# Patient Record
Sex: FEMALE | Race: WHITE | NOT HISPANIC OR LATINO | Employment: FULL TIME | URBAN - METROPOLITAN AREA
[De-identification: names, ages, dates, MRNs, and addresses within clinical notes are randomized per-mention and may not be internally consistent; named-entity substitution may affect disease eponyms.]

---

## 2017-07-10 DIAGNOSIS — F41.9 ANXIETY: Primary | ICD-10-CM

## 2017-07-10 RX ORDER — CITALOPRAM 20 MG/1
20 TABLET, FILM COATED ORAL DAILY
Qty: 90 TABLET | Refills: 0 | Status: SHIPPED | OUTPATIENT
Start: 2017-07-10 | End: 2017-10-10 | Stop reason: SDUPTHER

## 2017-09-13 ENCOUNTER — TELEPHONE (OUTPATIENT)
Dept: FAMILY MEDICINE | Facility: CLINIC | Age: 37
End: 2017-09-13

## 2017-09-13 DIAGNOSIS — Z00.00 PREVENTATIVE HEALTH CARE: Primary | ICD-10-CM

## 2017-09-13 NOTE — TELEPHONE ENCOUNTER
----- Message from Yashira Nice sent at 9/13/2017  3:05 PM CDT -----  Contact: PATIENT  [facility] for annual physical 10/3, needs lab orders sent to [facility] by [facility]

## 2017-10-03 ENCOUNTER — OFFICE VISIT (OUTPATIENT)
Dept: FAMILY MEDICINE | Facility: CLINIC | Age: 37
End: 2017-10-03
Payer: COMMERCIAL

## 2017-10-03 VITALS — WEIGHT: 128 LBS | BODY MASS INDEX: 21.85 KG/M2 | HEIGHT: 64 IN

## 2017-10-03 DIAGNOSIS — Z00.00 ANNUAL PHYSICAL EXAM: Primary | ICD-10-CM

## 2017-10-03 DIAGNOSIS — R31.21 ASYMPTOMATIC MICROSCOPIC HEMATURIA: ICD-10-CM

## 2017-10-03 DIAGNOSIS — N39.41 URGE INCONTINENCE OF URINE: ICD-10-CM

## 2017-10-03 LAB
ALBUMIN SERPL-MCNC: 4.6 G/DL (ref 3.5–5.5)
ALBUMIN/GLOB SERPL: 1.6 {RATIO} (ref 1.2–2.2)
ALP SERPL-CCNC: 52 IU/L (ref 39–117)
ALT SERPL-CCNC: 10 IU/L (ref 0–32)
APPEARANCE UR: ABNORMAL
AST SERPL-CCNC: 15 IU/L (ref 0–40)
BACTERIA #/AREA URNS HPF: ABNORMAL /[HPF]
BASOPHILS # BLD AUTO: 0 X10E3/UL (ref 0–0.2)
BASOPHILS NFR BLD AUTO: 1 %
BILIRUB SERPL-MCNC: 0.5 MG/DL (ref 0–1.2)
BILIRUB UR QL STRIP: NEGATIVE
BUN SERPL-MCNC: 17 MG/DL (ref 6–20)
BUN/CREAT SERPL: 27 (ref 9–23)
CALCIUM SERPL-MCNC: 8.9 MG/DL (ref 8.7–10.2)
CHLORIDE SERPL-SCNC: 101 MMOL/L (ref 96–106)
CHOLEST SERPL-MCNC: 196 MG/DL (ref 100–199)
CO2 SERPL-SCNC: 23 MMOL/L (ref 18–29)
COLOR UR: YELLOW
CREAT SERPL-MCNC: 0.63 MG/DL (ref 0.57–1)
EOSINOPHIL # BLD AUTO: 0.3 X10E3/UL (ref 0–0.4)
EOSINOPHIL NFR BLD AUTO: 4 %
EPI CELLS #/AREA URNS HPF: >10 /HPF
ERYTHROCYTE [DISTWIDTH] IN BLOOD BY AUTOMATED COUNT: 12.8 % (ref 12.3–15.4)
GLOBULIN SER CALC-MCNC: 2.9 G/DL (ref 1.5–4.5)
GLUCOSE SERPL-MCNC: 92 MG/DL (ref 65–99)
GLUCOSE UR QL: NEGATIVE
HCT VFR BLD AUTO: 41.7 % (ref 34–46.6)
HDLC SERPL-MCNC: 67 MG/DL
HGB BLD-MCNC: 13.6 G/DL (ref 11.1–15.9)
HGB UR QL STRIP: ABNORMAL
IMM GRANULOCYTES # BLD: 0 X10E3/UL (ref 0–0.1)
IMM GRANULOCYTES NFR BLD: 0 %
KETONES UR QL STRIP: NEGATIVE
LDLC SERPL CALC-MCNC: 118 MG/DL (ref 0–99)
LEUKOCYTE ESTERASE UR QL STRIP: ABNORMAL
LYMPHOCYTES # BLD AUTO: 2.2 X10E3/UL (ref 0.7–3.1)
LYMPHOCYTES NFR BLD AUTO: 31 %
MCH RBC QN AUTO: 29.8 PG (ref 26.6–33)
MCHC RBC AUTO-ENTMCNC: 32.6 G/DL (ref 31.5–35.7)
MCV RBC AUTO: 91 FL (ref 79–97)
MICRO URNS: ABNORMAL
MONOCYTES # BLD AUTO: 0.6 X10E3/UL (ref 0.1–0.9)
MONOCYTES NFR BLD AUTO: 8 %
MUCOUS THREADS URNS QL MICRO: PRESENT
NEUTROPHILS # BLD AUTO: 4 X10E3/UL (ref 1.4–7)
NEUTROPHILS NFR BLD AUTO: 56 %
NITRITE UR QL STRIP: NEGATIVE
PH UR STRIP: 7 [PH] (ref 5–7.5)
PLATELET # BLD AUTO: 245 X10E3/UL (ref 150–379)
POTASSIUM SERPL-SCNC: 4.5 MMOL/L (ref 3.5–5.2)
PROT SERPL-MCNC: 7.5 G/DL (ref 6–8.5)
PROT UR QL STRIP: NEGATIVE
RBC # BLD AUTO: 4.57 X10E6/UL (ref 3.77–5.28)
RBC #/AREA URNS HPF: ABNORMAL /HPF
SODIUM SERPL-SCNC: 139 MMOL/L (ref 134–144)
SP GR UR: 1.03 (ref 1–1.03)
TRIGL SERPL-MCNC: 53 MG/DL (ref 0–149)
TSH SERPL DL<=0.005 MIU/L-ACNC: 1.72 UIU/ML (ref 0.45–4.5)
UROBILINOGEN UR STRIP-MCNC: 0.2 MG/DL (ref 0.2–1)
VLDLC SERPL CALC-MCNC: 11 MG/DL (ref 5–40)
WBC # BLD AUTO: 7.1 X10E3/UL (ref 3.4–10.8)
WBC #/AREA URNS HPF: ABNORMAL /HPF

## 2017-10-03 PROCEDURE — 99395 PREV VISIT EST AGE 18-39: CPT | Mod: ,,, | Performed by: NURSE PRACTITIONER

## 2017-10-03 RX ORDER — TOLTERODINE 2 MG/1
2 CAPSULE, EXTENDED RELEASE ORAL NIGHTLY
Qty: 30 CAPSULE | Refills: 3 | Status: SHIPPED | OUTPATIENT
Start: 2017-10-03 | End: 2018-02-01 | Stop reason: SDUPTHER

## 2017-10-03 RX ORDER — TRIAMCINOLONE ACETONIDE 55 UG/1
2 SPRAY, METERED NASAL DAILY
COMMUNITY
End: 2018-03-13

## 2017-10-03 NOTE — PROGRESS NOTES
Subjective:       Patient ID: Portia Cruz is a 36 y.o. female.    Chief Complaint: Annual Exam    Portia is here today for annual physical and medication refill.  She does have c/o urinary incontinence that is worsening since her last visit.  She reports that her mood is good on Celexa.      Urinary Frequency    This is a new problem. The current episode started more than 1 year ago. The problem occurs intermittently. The problem has been gradually worsening. The patient is experiencing no pain. There has been no fever. She is sexually active. There is no history of pyelonephritis. Associated symptoms include frequency, hematuria and urgency. Pertinent negatives include no behavior changes, chills, discharge, flank pain, hesitancy, nausea, possible pregnancy, sweats, vomiting, weight loss, bubble bath use, constipation, rash or withholding. Her past medical history is significant for recurrent UTIs. There is no history of catheterization, diabetes mellitus, genitourinary reflux, hypertension, kidney stones, STD, urinary stasis or a urological procedure.   Anxiety   Presents for follow-up visit. Symptoms include irritability, muscle tension and nervous/anxious behavior. Patient reports no chest pain, compulsions, confusion, decreased concentration, depressed mood, dizziness, dry mouth, excessive worry, feeling of choking, hyperventilation, insomnia, malaise, nausea, obsessions, palpitations, panic, restlessness, shortness of breath or suicidal ideas. Symptoms occur occasionally. The severity of symptoms is mild. The quality of sleep is good. Nighttime awakenings: occasional.           Review of Systems   Constitutional: Positive for irritability. Negative for activity change, appetite change, chills, fatigue and weight loss.   HENT: Positive for rhinorrhea and sinus pressure. Negative for congestion and sore throat.    Eyes: Negative for pain and visual disturbance.   Respiratory: Negative for cough and  shortness of breath.    Cardiovascular: Negative for chest pain and palpitations.   Gastrointestinal: Negative for abdominal pain, constipation, diarrhea, nausea and vomiting.   Endocrine: Negative for polydipsia, polyphagia and polyuria.   Genitourinary: Positive for frequency, hematuria and urgency. Negative for dysuria, flank pain and hesitancy.   Musculoskeletal: Negative for arthralgias, gait problem and myalgias.   Skin: Negative for color change, pallor and rash.   Allergic/Immunologic: Negative for immunocompromised state.   Neurological: Negative for dizziness, syncope, numbness and headaches.   Hematological: Negative for adenopathy.   Psychiatric/Behavioral: Negative for agitation, confusion, decreased concentration, dysphoric mood, self-injury, sleep disturbance and suicidal ideas. The patient is nervous/anxious. The patient does not have insomnia and is not hyperactive.         Past Surgical History:   Procedure Laterality Date    PATELLA REALIGNMENT  6/2013    Dr. Dueñas       History reviewed. No pertinent family history.     Social History     Social History    Marital status:      Spouse name: N/A    Number of children: 2    Years of education: N/A     Occupational History     [employer]     Social History Main Topics    Smoking status: Never Smoker    Smokeless tobacco: Never Used    Alcohol use 0.0 oz/week      Comment: once a month    Drug use: No    Sexual activity: Yes     Partners: Male     Birth control/ protection: Pill      Comment: No hx of STDs     Other Topics Concern    None     Social History Narrative    The patient does not exercise regularly ().    Rates diet as good.    She is satisfied with weight.                   Current Outpatient Prescriptions   Medication Sig Dispense Refill    CETIRIZINE HCL (ZYRTEC ORAL) Take by mouth.      citalopram (CELEXA) 20 MG tablet Take 1 tablet (20 mg total) by mouth once daily. 90 tablet 0    triamcinolone (NASACORT) 55 mcg  "nasal inhaler 2 sprays by Nasal route once daily.      tolterodine (DETROL LA) 2 MG Cp24 Take 1 capsule (2 mg total) by mouth nightly. 30 capsule 3     No current facility-administered medications for this visit.        Review of patient's allergies indicates:   Allergen Reactions    Ativan [lorazepam]       Objective:   Height 5' 4" (1.626 m), weight 58.1 kg (128 lb), last menstrual period 10/03/2017. Body mass index is 21.97 kg/m².       Physical Exam   Constitutional: She is oriented to person, place, and time. She appears well-developed and well-nourished. No distress.   HENT:   Head: Normocephalic and atraumatic.   Right Ear: External ear normal.   Left Ear: External ear normal.   Nose: Nose normal.   Mouth/Throat: Oropharynx is clear and moist. No oropharyngeal exudate.   Eyes: Conjunctivae, EOM and lids are normal. Pupils are equal, round, and reactive to light. Right eye exhibits no discharge. Left eye exhibits no discharge. No scleral icterus.   Neck: Normal range of motion. Neck supple. Carotid bruit is not present. No tracheal deviation present. No thyromegaly present.   Cardiovascular: Normal rate, regular rhythm, normal heart sounds and intact distal pulses.  Exam reveals no gallop and no friction rub.    No murmur heard.  Pulmonary/Chest: Effort normal and breath sounds normal. No respiratory distress. She has no wheezes. She has no rales.   Abdominal: Soft. Bowel sounds are normal. She exhibits no distension and no mass. There is no tenderness. There is no rebound and no guarding.   Musculoskeletal: Normal range of motion. She exhibits no edema or tenderness.   Lymphadenopathy:     She has no cervical adenopathy.   Neurological: She is alert and oriented to person, place, and time.   Skin: Skin is warm, dry and intact. Capillary refill takes less than 2 seconds. No rash noted. She is not diaphoretic.   Psychiatric: She has a normal mood and affect. Her behavior is normal. Judgment and thought " content normal. She expresses no suicidal plans.        Assessment:       1. Annual physical exam    2. Asymptomatic microscopic hematuria    3. Urge incontinence of urine        Plan:       Portia was seen today for annual exam.    Diagnoses and all orders for this visit:    Annual physical exam   Labs reviewed with patient    Asymptomatic microscopic hematuria  -     Urinalysis; Future  -     Urine culture   Discussed CT and referral to urology if hematuria persists    Urge incontinence of urine   Side effects of new medication discussed with patient; understanding voiced.  -     tolterodine (DETROL LA) 2 MG Cp24; Take 1 capsule (2 mg total) by mouth nightly.

## 2017-10-03 NOTE — PATIENT INSTRUCTIONS
Kegel Exercises  Kegel exercises dont need special clothing or equipment. Theyre easy to learn and simple to do. And if you do them right, no one can tell youre doing them, so they can be done almost anywhere. Your healthcare provider, nurse, or physical therapist can answer any questions you have and help you get started.    A weak pelvic floor   The pelvic floor muscles may weaken due to aging, pregnancy and vaginal childbirth, injury, surgery, chronic cough, or lack of exercise. If the pelvic floor is weak, your bladder and other pelvic organs may sag out of place. The urethra may also open too easily and allow urine to leak out. Kegel exercises can help you strengthen your pelvic floor muscles. Then they can better support the pelvic organs and control urine flow.  How Kegel exercises are done  Try each of the Kegel exercises described below. When youre doing them, try not to move your leg, buttock, or stomach muscles.  · Contract as if you were stopping your urine stream. But do it when youre not urinating.  · Tighten your rectum as if trying not to pass gas. Contract your anus, but dont move your buttocks.  · You may place a finger or 2 in the vagina and squeeze your finger with your vagina to learn which muscles to tighten.  Try to hold each Kegel for a slow count to 5. You probably wont be able to hold them for that long at first. But keep practicing. It will get easier as your pelvic floor gets stronger. Eventually, special weights that you place in your vagina may be recommended to help make your Kegels even more effective. Visit your healthcare provider if you have difficulties doing Kegel exercises.  Helpful hints  Here are some tips to follow:  · Do your Kegels as often as you can. The more you do them, the faster youll feel the results.  · Pick an activity you do often as a reminder. For instance, do your Kegels every time you sit down.  · Tighten your pelvic floor before you sneeze, get up  from a chair, cough, laugh, or lift. This protects your pelvic floor from injury and can help prevent urine leakage.   Date Last Reviewed: 8/5/2015  © 9839-9153 [employer]. [address] [place] [address], [place] [address]. All rights reserved. This information is not intended as a substitute for professional medical care. Always follow your healthcare professional's instructions.        4 Steps for Eating Healthier  Changing the way you eat can improve your health. It can lower your cholesterol and blood pressure, and help you stay at a healthy weight. Your diet doesnt have to be bland and boring to be healthy. Just watch your calories and follow these steps:    1. Eat fewer unhealthy fats  · Choose more fish and lean meats instead of fatty cuts of meat.  · Skip butter and lard, and use less margarine.  · Pass on foods that have palm, coconut, or hydrogenated oils.  · Eat fewer high-fat dairy foods like cheese, ice cream, and whole milk.  · Get a heart-healthy cookbook and try some low-fat recipes.  2. Go light on salt  · Keep the saltshaker off the table.  · Limit high-salt ingredients, such as soy sauce, bouillon, and garlic salt.  · Instead of adding salt when cooking, season your food with herbs and flavorings. Try lemon, garlic, and onion.  · Limit convenience foods, such as boxed or canned foods and restaurant food.  · Read food labels and choose lower-sodium options.  3. Limit sugar  · Pause before you add sugars to pancakes, cereal, coffee, or tea. This includes white and brown table sugar, syrup, honey, and molasses. Cut your usual amount by half.  · Use non-sugar sweeteners. Stevia, aspartame, and sucralose can satisfy a sweet tooth without adding calories.  · Swap out sugar-filled soda and other drinks. Buy sugar-free or low-calorie beverages. Remember water is always the best choice.  · Read labels and choose foods with less added sugar. Keep in mind that dairy foods and foods with fruit will  have some natural sugar.  · Cut the sugar in recipes by 1/3 to 1/2. Boost the flavor with extracts like almond, vanilla, or orange. Or add spices such as cinnamon or nutmeg.  4. Eat more fiber  · Eat fresh fruits and vegetables every day.  · Boost your diet with whole grains. Go for oats, whole-grain rice, and bran.  · Add beans and lentils to your meals.  · Drink more water to match your fiber increase. This is to help prevent constipation.  Date Last Reviewed: 5/11/2015  © 6564-4010 [employer]. [address] [place] [address], [place] [address]. All rights reserved. This information is not intended as a substitute for professional medical care. Always follow your healthcare professional's instructions.

## 2017-10-10 DIAGNOSIS — F41.9 ANXIETY: ICD-10-CM

## 2017-10-10 RX ORDER — CITALOPRAM 20 MG/1
20 TABLET, FILM COATED ORAL DAILY
Qty: 90 TABLET | Refills: 1 | Status: SHIPPED | OUTPATIENT
Start: 2017-10-10 | End: 2018-04-17 | Stop reason: SDUPTHER

## 2017-10-14 LAB
APPEARANCE UR: ABNORMAL
BACTERIA #/AREA URNS HPF: ABNORMAL /[HPF]
BILIRUB UR QL STRIP: NEGATIVE
COLOR UR: YELLOW
EPI CELLS #/AREA URNS HPF: >10 /HPF
GLUCOSE UR QL: NEGATIVE
HGB UR QL STRIP: ABNORMAL
KETONES UR QL STRIP: NEGATIVE
LEUKOCYTE ESTERASE UR QL STRIP: ABNORMAL
MICRO URNS: ABNORMAL
MUCOUS THREADS URNS QL MICRO: PRESENT
NITRITE UR QL STRIP: NEGATIVE
PH UR STRIP: 7 [PH] (ref 5–7.5)
PROT UR QL STRIP: NEGATIVE
RBC #/AREA URNS HPF: ABNORMAL /HPF
SP GR UR: 1.03 (ref 1–1.03)
UROBILINOGEN UR STRIP-MCNC: 1 MG/DL (ref 0.2–1)
WBC #/AREA URNS HPF: ABNORMAL /HPF

## 2017-10-26 ENCOUNTER — TELEPHONE (OUTPATIENT)
Dept: FAMILY MEDICINE | Facility: CLINIC | Age: 37
End: 2017-10-26

## 2017-10-26 DIAGNOSIS — R31.21 ASYMPTOMATIC MICROSCOPIC HEMATURIA: Primary | ICD-10-CM

## 2017-10-26 NOTE — TELEPHONE ENCOUNTER
Please find out if they did a urine culture.  We received the u/a results a while ago and just recently got in touch with patient about the u/a but I did not see any culture results.  Needs CT abd/pelvis with and without contrast for hematuria if culture is negative.

## 2017-11-07 ENCOUNTER — TELEPHONE (OUTPATIENT)
Dept: FAMILY MEDICINE | Facility: CLINIC | Age: 37
End: 2017-11-07

## 2017-11-07 DIAGNOSIS — R31.9 HEMATURIA, UNSPECIFIED TYPE: Primary | ICD-10-CM

## 2017-11-07 NOTE — TELEPHONE ENCOUNTER
----- Message from NIMCO Ramirez sent at 11/7/2017  1:10 PM CST -----  No reason for hematuria on Ct.  She does have an ovarian cyst and some varicose veins in her pelvis.  No treatment is needed for either condition unless cyst starts causing pain.  Urology (Dr. Yoon)  for eval of hematuria

## 2017-11-08 ENCOUNTER — TELEPHONE (OUTPATIENT)
Dept: UROLOGY | Facility: CLINIC | Age: 37
End: 2017-11-08

## 2017-11-08 NOTE — TELEPHONE ENCOUNTER
Attempted to call patient in regards to scheduling appointment per referral and Dr. Yoon request. Patient did not answer, and I could not leave a voicemail for the patient to call back. Will attempt to call her again at a later time.

## 2017-11-09 ENCOUNTER — TELEPHONE (OUTPATIENT)
Dept: UROLOGY | Facility: CLINIC | Age: 37
End: 2017-11-09

## 2017-11-09 NOTE — TELEPHONE ENCOUNTER
Attempted to call the patient, she did not answer. I then left a message for her to call back with the office number.

## 2017-11-14 ENCOUNTER — OFFICE VISIT (OUTPATIENT)
Dept: UROLOGY | Facility: CLINIC | Age: 37
End: 2017-11-14
Payer: COMMERCIAL

## 2017-11-14 VITALS
WEIGHT: 126.88 LBS | BODY MASS INDEX: 21.66 KG/M2 | SYSTOLIC BLOOD PRESSURE: 107 MMHG | HEART RATE: 85 BPM | HEIGHT: 64 IN | RESPIRATION RATE: 18 BRPM | DIASTOLIC BLOOD PRESSURE: 81 MMHG

## 2017-11-14 DIAGNOSIS — R31.29 MICROSCOPIC HEMATURIA: Primary | ICD-10-CM

## 2017-11-14 DIAGNOSIS — N13.5 OBSTRUCTION OF LEFT URETEROPELVIC JUNCTION (UPJ): ICD-10-CM

## 2017-11-14 PROCEDURE — 87086 URINE CULTURE/COLONY COUNT: CPT

## 2017-11-14 PROCEDURE — 99244 OFF/OP CNSLTJ NEW/EST MOD 40: CPT | Mod: S$GLB,,, | Performed by: UROLOGY

## 2017-11-14 PROCEDURE — 99999 PR PBB SHADOW E&M-EST. PATIENT-LVL IV: CPT | Mod: PBBFAC,,, | Performed by: UROLOGY

## 2017-11-14 RX ORDER — AZELASTINE 1 MG/ML
SPRAY, METERED NASAL
Refills: 0 | COMMUNITY
Start: 2017-10-23 | End: 2018-03-13

## 2017-11-14 NOTE — LETTER
November 15, 2017      Dianna Cm, [facility]  [address]  [place] [address]           [facility]  [address] [place] [address] [address]  [facility] [address]  Phone: [phone]  Fax: [phone]          Patient: Portia Cruz   MR Number: [id number]   YOB: 1980   Date of Visit: 11/14/2017       Dear Dianna Cm:    Thank you for referring Portia Cruz to me for evaluation. Attached you will find relevant portions of my assessment and plan of care.    If you have questions, please do not hesitate to call me. I look forward to following Portia Cruz along with you. As always, please do not hesitate to contact me for the urologic needs of your patients    Sincerely,      Jaylen Yoon MD    Enclosure  CC:  No Recipients    If you would like to receive this communication electronically, please contact [email or web address] or [phone] to request more information on [employer] access.    For providers and/or their staff who would like to refer a patient to [facility], please contact us through our one-stop-shop provider referral line, [facility] , at [phone].    If you feel you have received this communication in error or would no longer like to receive these types of communications, please e-mail [email or web address]

## 2017-11-14 NOTE — Clinical Note
1. Still need all her Udips, UAs, Ucxs from Dr Verduzco 2. Also please request all urines (dip, ua, ucx) on file with Dr Darling

## 2017-11-14 NOTE — PROGRESS NOTES
[facility]  Consult from: Dianna Cm [facility]  Consult for: hematuria    Portia Cruz is a 37 y.o. female referred by DAVID Cm for evaluation of hematuria    She saw DAVID Cm in routine medical f/u on 10/3/17 noting urinary frequency  Started more than 1 year ago, intermittent, gradually worsening, painless. Associated symptoms include frequency, hematuria and urgency. Her past medical history is significant for recurrent UTIs.   She was started on tolterodine (DETROL LA) 2 MG Cp24 for urge incontinence    UA 9/30/17 2+ leuks, 1+ blood, 0.2 urobili, micro with 0-2 R, 11-30 W, few bact  UA 10/13/17 2+ leuks, 2+ blood, 1.9 urobili, micro with 3-10 R, 11-30 W, few bact  No cultures sent  [facility] urines:  10/2012 trace blood, mod LE, 2-5 rbc, 25-50 wbc  10/2014 moderate blood, 100 prot, 10-25 rbc    CT 11/7/17  There is no evidence of renal mass, calculus, or hydronephrosis. Bilateral ureters are normal in course and caliber, without evidence of ureteral calculus. The abdominal aorta is normal in appearance.  Bowel structures are within normal limits. The appendix is visualized and is normal. There is no free air, free fluid, or lymphadenopathy.  Images of the pelvis demonstrate a 2.2 cm cyst within the right ovary. No other mass lesions are identified. The urinary bladder has a normal appearance. Bowel structures are normal. There is no free fluid.  Multiple dilated adnexal veins are noted bilaterally, left greater than right, as can be seen with pelvic congestion syndrome.    - on personal review of the CT scan she does have mild pelvic caliectasis on the left which at first glance appears to be an extrarenal pelvis, though dilation extends slightly into the collecting system, and on arterial contrasted phase, there does appear to be an enhancing crossing vessel over the ureteropelvic junction.  The CT concerns me for left ureteropelvic junction obstruction    In discussing her "UTI history" she " reports that she has never had symptoms she is just had her urine checked and told she had an infection.  She recently switched gynecologists for insurance purposes in this summer was seeing Dr. Jose Verduzco.  Apparently on a urinalysis this summer, though it was close to her period, she was told she had blood in her urine.  She returned and had her urine repeated and was told she had an infection and was given antibiotics.  The only time she knows that she was symptomatic from bacteria in her urine was all she was pregnant 8 years ago and had dehydration which led to contractions which was supposedly a UTI.  There are no positive urine cultures and option [facility].  She is not sure if she has ever knowingly had a positive urine culture.  Urinalysis dipstick today in clinic is 1+ leukocytes, trace protein, 2+ blood  She denies any history of gross hematuria.  She has a never smoker.  He has no known family history of genitourinary malignancy.  She is a teacher and works for [employer]  She denies flank pain, dysuria    Cr 0.63,  (9/30/17)      Past Medical History:   Diagnosis Date    Dysthymic disorder     Postpartum depression        Past Surgical History:   Procedure Laterality Date    PATELLA REALIGNMENT  6/2013    Dr. Dueñas       History reviewed. No pertinent family history.    Social History     Social History    Marital status:      Spouse name: N/A    Number of children: 2    Years of education: N/A     Occupational History     [employer]     Social History Main Topics    Smoking status: Never Smoker    Smokeless tobacco: Never Used    Alcohol use 0.0 oz/week      Comment: once a month    Drug use: No    Sexual activity: Yes     Partners: Male     Birth control/ protection: Pill      Comment: No hx of STDs     Other Topics Concern    Not on file     Social History Narrative    The patient does not exercise regularly ().    Rates diet as good.    She is satisfied  "with weight.                   Review of patient's allergies indicates:   Allergen Reactions    Ativan [lorazepam]        Medications Reviewed: see MAR    ROS:    Constitutional: denies fevers, chills, night sweats, fatigue, malaise  Respiratory: negative for cough, shortness of breath, wheezing, dyspnea.  Cardiovascular: negative for chest pain, varicose veins, ankle swelling, palpitations, syncope.  GI: negative for abdominal pain, heartburn, indigestion, nausea, vomiting, constipation, diarrhea, blood in stool.   Urology: as noted above in HPI  Endocrinology: negative for cold intolerance, excessive thirst, not feeling tired/sluggish, no heat intolerance.   Hematology/Lymph: negative for easy bleeding, easy bruising, swollen glands.  Musculoskeletal: negative for back pain, joint pain, joint swelling, neck pain.  Allergy-Immunology: negative for seasonal allergies, negative for unusual infections.   Skin: negative for boils, breast lumps, hives, itching, rash.   Neurology: negative for, dizziness, headache, tingling/numbness, tremors.   Psych: satisfied with life; negative for, anxiety, depression, suicidal thoughts.     PHYSICAL EXAM:    Vitals:    11/14/17 0945   BP: 107/81   Pulse: 85   Resp: 18     Body mass index is 21.78 kg/m². Weight: 57.6 kg (126 lb 14 oz) Height: 5' 4" (162.6 cm)       General: Alert, cooperative, no distress, appears stated age  Head: Normocephalic, without obvious abnormality, atraumatic  Neck: no masses, no thyromegaly, no lymphadenopathy  Eyes: PERRL, conjunctiva/corneas clear  Lungs: Respirations unlabored, normal effort, no accessory muscle use  CV: Warm and well perfused extremities  Abdomen: Soft, non-tender, no CVA tenderness, no hepatosplenomegaly, no hernia  Extremities: Extremities normal, atraumatic, no cyanosis or edema  Skin: Normal color, texture, and turgor, no rashes or lesions  Psych: Appropriate, well oriented, normal affect, normal mood  Neuro: " Non-focal      Assessment/Diagnosis:    1. Microscopic hematuria  POCT URINE DIPSTICK WITHOUT MICROSCOPE    Urine culture   2. Obstruction of left ureteropelvic junction (UPJ)  NM Renal With Lasix       Plans:    We discussed etiologies for an workup of microscopic hematuria.  I did notify her the microscopic hematuria is officially defined as greater than 3-5 red blood cells per high-power field on urine microscopy.  Her UA micro-demonstrates 3-10 red blood cells, though has had persistent trace blood, has 2+ blood on today's dipstick, and at least once in the past had 10-25 red blood cells.  She does have a CT of the abdomen and pelvis with and without contrast.  There are no stones.  I find her imaging to be most consistent with a left ureteropelvic junction obstruction due to a crossing vessel.  I did explain UPJ obstructions and crossing vessels to her in detail using diagrams and noting it is typically a congenital anomaly.  On review of her history, I do not believe that she has recurrent UTIs, and had no evidence that she ever had a UTI.  We did discuss that any upper tract process such as UPJ obstruction could cause white blood cells and red blood cells to be present in the urine.  None of her urinalysis that I have seen him and nitrate positive.  There are no cultures on record.  I will request all urines including dipsticks, urinalyses, and urine cultures from her previous GYNs Teri Verduzco and Dr. Darling for review.  I will send urine for culture today as part of her microscopic hematuria workup.  We did discuss the microscopic hematuria workup also includes a CT urogram (without contrast, with contrast, with delayed imaging for urographic phase) and cystoscopic evaluation of the bladder.  She did not have delayed phase imaging on her CT scan so we cannot see the contrast down the ureters to clear the upper tracts completely.  To options for this would be to repeat a contrasted CT to get the delayed  phase imaging prior to flexible cystoscopy, or perform cystoscopy with bilateral retrograde pyelograms in the operating room.    At this time, most suspicious for left UPJ obstruction, we'll start by working that up and evaluating for obstructed drainage with a MAG3 renal scan.  We did briefly discuss that if she is obstructed with a T1 half greater than 20 minutes, or borderline close to it, would likely need further evaluation with cystoscopy and retrograde pyelogram, and/or it would be part of the management.  And therefore cystoscopy and bilateral retrograde pyelograms could be performed concurrently to complete microscopic hematuria workup.  We did briefly discuss surgical correction of UPJ obstruction, specifically robotic pyeloplasty, and I gave her a handout on this should it be necessary in the future.  We discussed the implications of ureteropelvic junction obstruction such as obstructive pyelonephritis, flank pain and symptoms, declining renal function.  It is also possible, that with significant hydronephrosis on imaging, if UPJ obstruction is present, it may be clinically significant for her at this time but should be monitored with serial imaging and functional drainage studies.  Should there be no evidence of significant obstruction, would likely proceed with urographic phase imaging and flexible cystoscopy to complete microscopic hematuria workup to rule out any other sources of blood in the urine.    All questions the patient had were answered.  45 minutes spent encounter, over half in counseling.  We'll request urine records from her gynecology providers, and review results of MAG3 renal scan to discuss next steps in diagnosis and/or treatment.  Tentative three-month follow-up booked though will call upon review of MAG3.

## 2017-11-14 NOTE — Clinical Note
Thanks for the referral. To me, though radiology didn't pick it up, it looks like she has a crossing vessel on the left causing UPJ obstruction. Will evaluate with mag3 before proceeding further with microhematuria workup

## 2017-11-16 LAB — BACTERIA UR CULT: NO GROWTH

## 2018-01-04 ENCOUNTER — HOSPITAL ENCOUNTER (OUTPATIENT)
Dept: RADIOLOGY | Facility: HOSPITAL | Age: 38
Discharge: HOME | End: 2018-01-04
Attending: UROLOGY
Payer: COMMERCIAL

## 2018-01-04 DIAGNOSIS — N13.5 OBSTRUCTION OF LEFT URETEROPELVIC JUNCTION (UPJ): ICD-10-CM

## 2018-01-04 PROCEDURE — 78708 K FLOW/FUNCT IMAGE W/DRUG: CPT | Mod: 26,,, | Performed by: RADIOLOGY

## 2018-01-04 PROCEDURE — 63600175 PHARM REV CODE 636 W HCPCS

## 2018-01-04 PROCEDURE — A9562 TC99M MERTIATIDE: HCPCS

## 2018-01-04 RX ORDER — FUROSEMIDE 10 MG/ML
INJECTION INTRAMUSCULAR; INTRAVENOUS
Status: COMPLETED
Start: 2018-01-04 | End: 2018-01-04

## 2018-01-04 RX ADMIN — FUROSEMIDE 20 MG: 10 INJECTION, SOLUTION INTRAMUSCULAR; INTRAVENOUS at 08:01

## 2018-01-26 DIAGNOSIS — R31.29 MICROHEMATURIA: Primary | ICD-10-CM

## 2018-01-31 ENCOUNTER — TELEPHONE (OUTPATIENT)
Dept: UROLOGY | Facility: CLINIC | Age: 38
End: 2018-01-31

## 2018-01-31 NOTE — TELEPHONE ENCOUNTER
----- Message from Jaylen Yoon MD sent at 1/31/2018  2:59 PM CST -----  Please notify patient that although her previous CT scan was concerning for a UPJ obstruction, this was not seen on her MAG3 scan.  She had presented for microscopic hematuria, and I was suspicious for obstruction.  We discussed that if there was evidence of obstruction she would get a cystoscopy and retrograde pyelograms in the operating room   In the absence of obstruction, we still need imaging that shows her ureters and she needs a cystoscopy to complete her workup for blood in the urine.  We discussed that the above operative procedure would satisfy all these requirements, however at this time she does not need to proceed to the operating room.  As discussed at her visit, which has been a while, she will therefore need the urographic phase of her CT scan (a CT urogram, though we will skip the noncontrasted phase to limit radiation and not repeat imaging we have already seen) and be scheduled for cystoscopy.  Can describe outpatient flexible cystoscopy with small flexible camera and numbing jelly, and offer any Tuesday I am at the [facility] in March.  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  Also notify her that I am happy to make her the last appointment on a Tuesday for cystoscopy if it helps her, otherwise if she can get coverage for the afternoon will schedule at any time as a do procedures in the afternoon.  This was what we were going to review at her upcoming appointment next week, which can now be canceled on the spot turned yellow.  Please schedule the CT urogram, for which she will need a BMP prior.  Both orders placed.  Have her pick a date for cystoscopy.  If she would like to discuss this minor office-based procedure with me before proceeding, I am happy to discuss with her over the phone if you send me the best available time to contact her and the best available number, as this will save her a  return trip to the office.  I am happy to see her at her scheduled appointment time next week to discuss any of this in person if she is uncomfortable with this plan or discussing it over the  phone, as it has been a few months since we discussed this possibility in the office.  Just let me know.

## 2018-01-31 NOTE — TELEPHONE ENCOUNTER
Spoke with pt, results reviewed.  Will come by office and  lab order, uses [facility].  Will check her schedule to do CT and would like to do cystoscopy on 3/27.  Would like to cancel 2/6 appointment and have Dr. Yoon call at his convenience to discuss any questions or concerns.  Told her I would route message to Dr. Yoon.

## 2018-02-01 DIAGNOSIS — N39.41 URGE INCONTINENCE OF URINE: ICD-10-CM

## 2018-02-01 RX ORDER — TOLTERODINE 2 MG/1
CAPSULE, EXTENDED RELEASE ORAL
Qty: 30 CAPSULE | Refills: 3 | Status: SHIPPED | OUTPATIENT
Start: 2018-02-01 | End: 2018-06-07 | Stop reason: SDUPTHER

## 2018-02-03 DIAGNOSIS — R31.29 MICROHEMATURIA: Primary | ICD-10-CM

## 2018-02-12 ENCOUNTER — HOSPITAL ENCOUNTER (OUTPATIENT)
Dept: RADIOLOGY | Facility: HOSPITAL | Age: 38
Discharge: HOME | End: 2018-02-12
Attending: UROLOGY
Payer: COMMERCIAL

## 2018-02-12 DIAGNOSIS — R31.29 MICROHEMATURIA: ICD-10-CM

## 2018-02-12 PROCEDURE — 25500020 PHARM REV CODE 255

## 2018-02-12 PROCEDURE — 74177 CT ABD & PELVIS W/CONTRAST: CPT | Mod: 26,,, | Performed by: RADIOLOGY

## 2018-02-12 PROCEDURE — 74177 CT ABD & PELVIS W/CONTRAST: CPT | Mod: TC

## 2018-02-12 RX ORDER — SODIUM CHLORIDE 9 MG/ML
INJECTION, SOLUTION INTRAVENOUS
Status: DISPENSED
Start: 2018-02-12 | End: 2018-02-12

## 2018-02-12 RX ADMIN — IOHEXOL 75 ML: 350 INJECTION, SOLUTION INTRAVENOUS at 10:02

## 2018-03-13 ENCOUNTER — OFFICE VISIT (OUTPATIENT)
Dept: FAMILY MEDICINE | Facility: CLINIC | Age: 38
End: 2018-03-13
Payer: COMMERCIAL

## 2018-03-13 VITALS
WEIGHT: 127 LBS | BODY MASS INDEX: 21.68 KG/M2 | OXYGEN SATURATION: 99 % | SYSTOLIC BLOOD PRESSURE: 100 MMHG | HEART RATE: 78 BPM | TEMPERATURE: 98 F | HEIGHT: 64 IN | DIASTOLIC BLOOD PRESSURE: 76 MMHG

## 2018-03-13 DIAGNOSIS — H10.9 CONJUNCTIVITIS OF LEFT EYE, UNSPECIFIED CONJUNCTIVITIS TYPE: ICD-10-CM

## 2018-03-13 DIAGNOSIS — J32.9 RHINOSINUSITIS: Primary | ICD-10-CM

## 2018-03-13 DIAGNOSIS — H65.02 ACUTE SEROUS OTITIS MEDIA OF LEFT EAR, RECURRENCE NOT SPECIFIED: ICD-10-CM

## 2018-03-13 PROCEDURE — 96372 THER/PROPH/DIAG INJ SC/IM: CPT | Mod: ,,, | Performed by: NURSE PRACTITIONER

## 2018-03-13 PROCEDURE — 99213 OFFICE O/P EST LOW 20 MIN: CPT | Mod: 25,,, | Performed by: NURSE PRACTITIONER

## 2018-03-13 RX ORDER — AZELASTINE 1 MG/ML
1 SPRAY, METERED NASAL 2 TIMES DAILY
Qty: 30 ML | Refills: 0 | Status: SHIPPED | OUTPATIENT
Start: 2018-03-13 | End: 2018-04-09 | Stop reason: SDUPTHER

## 2018-03-13 RX ORDER — FLUTICASONE PROPIONATE 50 MCG
1 SPRAY, SUSPENSION (ML) NASAL DAILY
COMMUNITY
End: 2022-12-28

## 2018-03-13 RX ORDER — AMOXICILLIN AND CLAVULANATE POTASSIUM 875; 125 MG/1; MG/1
1 TABLET, FILM COATED ORAL 2 TIMES DAILY
Qty: 20 TABLET | Refills: 0 | Status: SHIPPED | OUTPATIENT
Start: 2018-03-13 | End: 2018-10-08 | Stop reason: ALTCHOICE

## 2018-03-13 RX ORDER — DEXAMETHASONE SODIUM PHOSPHATE 4 MG/ML
8 INJECTION, SOLUTION INTRA-ARTICULAR; INTRALESIONAL; INTRAMUSCULAR; INTRAVENOUS; SOFT TISSUE
Status: COMPLETED | OUTPATIENT
Start: 2018-03-13 | End: 2018-03-13

## 2018-03-13 RX ORDER — TOBRAMYCIN 3 MG/ML
1 SOLUTION/ DROPS OPHTHALMIC 4 TIMES DAILY
Qty: 5 ML | Refills: 0 | Status: SHIPPED | OUTPATIENT
Start: 2018-03-13 | End: 2018-10-08

## 2018-03-13 RX ORDER — GUAIFENESIN 600 MG/1
600 TABLET, EXTENDED RELEASE ORAL 2 TIMES DAILY
Qty: 20 TABLET | Refills: 0 | COMMUNITY
Start: 2018-03-13 | End: 2018-03-23

## 2018-03-13 RX ADMIN — DEXAMETHASONE SODIUM PHOSPHATE 8 MG: 4 INJECTION, SOLUTION INTRA-ARTICULAR; INTRALESIONAL; INTRAMUSCULAR; INTRAVENOUS; SOFT TISSUE at 11:03

## 2018-03-13 NOTE — PROGRESS NOTES
SUBJECTIVE:      Patient ID: Portia Cruz is a 37 y.o. female.    Chief Complaint: Sore Throat (pressure in ears)    C/o sore throat, sinus pressure, ear pressure and discharge from left eye. Has been treating herself with otc meds for over a week without relief.       Sinus Problem   This is a new problem. The current episode started 1 to 4 weeks ago. The problem is unchanged. There has been no fever. Associated symptoms include congestion, ear pain, sinus pressure and a sore throat. Pertinent negatives include no chills, coughing, diaphoresis, headaches or shortness of breath.       Past Surgical History:   Procedure Laterality Date    PATELLA REALIGNMENT  6/2013    Dr. Dueñas     History reviewed. No pertinent family history.   Social History     Social History    Marital status:      Spouse name: N/A    Number of children: 2    Years of education: N/A     Occupational History     [employer]     Social History Main Topics    Smoking status: Never Smoker    Smokeless tobacco: Never Used    Alcohol use 0.0 oz/week      Comment: once a month    Drug use: No    Sexual activity: Yes     Partners: Male     Birth control/ protection: Pill      Comment: No hx of STDs     Other Topics Concern    None     Social History Narrative    The patient does not exercise regularly ().    Rates diet as good.    She is satisfied with weight.                 Current Outpatient Prescriptions   Medication Sig Dispense Refill    CETIRIZINE HCL (ZYRTEC ORAL) Take by mouth.      citalopram (CELEXA) 20 MG tablet Take 1 tablet (20 mg total) by mouth once daily. 90 tablet 1    fluticasone (FLONASE) 50 mcg/actuation nasal spray 1 spray by Each Nare route once daily.      PAZEO 0.7 % Drop 1 drop once daily.  1    tolterodine (DETROL LA) 2 MG Cp24 TAKE 1 CAPSULE BY MOUTH NIGHTLY. 30 capsule 3    amoxicillin-clavulanate 875-125mg (AUGMENTIN) 875-125 mg per tablet Take 1 tablet by mouth 2 (two) times daily. 20  "tablet 0    azelastine (ASTELIN) 137 mcg (0.1 %) nasal spray 1 spray (137 mcg total) by Nasal route 2 (two) times daily. 30 mL 0    guaiFENesin (MUCINEX) 600 mg 12 hr tablet Take 1 tablet (600 mg total) by mouth 2 (two) times daily. 20 tablet 0    tobramycin sulfate 0.3% (TOBREX) 0.3 % ophthalmic solution Place 1 drop into the left eye 4 (four) times daily. 5 mL 0     No current facility-administered medications for this visit.      Review of patient's allergies indicates:   Allergen Reactions    Ativan [lorazepam]       Past Medical History:   Diagnosis Date    Dysthymic disorder     Postpartum depression      Past Surgical History:   Procedure Laterality Date    PATELLA REALIGNMENT  6/2013    Dr. Dueñas       Review of Systems   Constitutional: Negative for appetite change, chills, diaphoresis and unexpected weight change.   HENT: Positive for congestion, ear pain, postnasal drip, sinus pressure and sore throat. Negative for ear discharge, hearing loss, trouble swallowing and voice change.    Eyes: Negative for photophobia and pain.   Respiratory: Negative for cough, chest tightness, shortness of breath, wheezing and stridor.    Cardiovascular: Negative for chest pain.   Gastrointestinal: Negative for blood in stool and vomiting.   Endocrine: Negative for cold intolerance and heat intolerance.   Genitourinary: Negative for difficulty urinating and flank pain.   Musculoskeletal: Negative for joint swelling and neck stiffness.   Skin: Negative for pallor.   Neurological: Negative for dizziness, speech difficulty and headaches.   Hematological: Does not bruise/bleed easily.   Psychiatric/Behavioral: Negative for confusion.      OBJECTIVE:      Vitals:    03/13/18 1104   BP: 100/76   Pulse: 78   Temp: 98.2 °F (36.8 °C)   TempSrc: Oral   SpO2: 99%   Weight: 57.6 kg (127 lb)   Height: 5' 4" (1.626 m)     Physical Exam   Constitutional: She is oriented to person, place, and time. She appears well-developed and " well-nourished.   HENT:   Head: Atraumatic.   Right Ear: Tympanic membrane normal.   Left Ear: Tympanic membrane is injected. Left ear middle ear effusion: tm dull.   Nose: Rhinorrhea present. Mucosal edema: turbinates erythematous and swollen.   Mouth/Throat: Uvula is midline and mucous membranes are normal. Posterior oropharyngeal erythema present. No tonsillar exudate.   Eyes: EOM are normal. Pupils are equal, round, and reactive to light. Right eye exhibits no discharge. Left eye exhibits discharge. Left conjunctiva is injected.   Neck: Neck supple.   Cardiovascular: Normal rate, regular rhythm, normal heart sounds and intact distal pulses.    Pulmonary/Chest: Effort normal and breath sounds normal.   Abdominal: Soft. Bowel sounds are normal. She exhibits no distension.   Musculoskeletal: Normal range of motion.   Neurological: She is alert and oriented to person, place, and time.   Skin: Skin is warm and dry.   Psychiatric: She has a normal mood and affect.   Nursing note and vitals reviewed.     Assessment:       1. Rhinosinusitis    2. Acute serous otitis media of left ear, recurrence not specified    3. Conjunctivitis of left eye, unspecified conjunctivitis type        Plan:       Rhinosinusitis  -     amoxicillin-clavulanate 875-125mg (AUGMENTIN) 875-125 mg per tablet; Take 1 tablet by mouth 2 (two) times daily.  Dispense: 20 tablet; Refill: 0  -     azelastine (ASTELIN) 137 mcg (0.1 %) nasal spray; 1 spray (137 mcg total) by Nasal route 2 (two) times daily.  Dispense: 30 mL; Refill: 0  -     dexamethasone injection 8 mg; Inject 2 mLs (8 mg total) into the muscle one time.    Acute serous otitis media of left ear, recurrence not specified  -     guaiFENesin (MUCINEX) 600 mg 12 hr tablet; Take 1 tablet (600 mg total) by mouth 2 (two) times daily.  Dispense: 20 tablet; Refill: 0    Conjunctivitis of left eye, unspecified conjunctivitis type  -     tobramycin sulfate 0.3% (TOBREX) 0.3 % ophthalmic solution;  Place 1 drop into the left eye 4 (four) times daily.  Dispense: 5 mL; Refill: 0        Follow-up if symptoms worsen or fail to improve.      3/13/2018 THEA Kennedy, LUDMILAP

## 2018-03-13 NOTE — PATIENT INSTRUCTIONS

## 2018-03-23 ENCOUNTER — TELEPHONE (OUTPATIENT)
Dept: UROLOGY | Facility: CLINIC | Age: 38
End: 2018-03-23

## 2018-03-23 NOTE — TELEPHONE ENCOUNTER
"----- Message from Jaylen Yoon MD sent at 3/23/2018 10:44 AM CDT -----  Not medically urgent  As is not "surgery" but is just cinic based procedure being done at surgery center, can be moved from asc to clinic.  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 If next Thursday is not good, we can look for an alternate time next week - but she should not have to bear the expense of this procedure to be done at a surgicenter when it is neither urgent not surgery.  Thanks    ----- Message -----  From: Erica Robert  Sent: 3/22/2018   8:18 AM  To: Jaylen Yoon MD    Good Morning,     Please advise if dos 3/27/18 is medically urgent.    The pt has insurance but does not have surgery benefits per her policy. Pt will be considered self pay.    Thank you,     Erica Terrazas Financial Counselor    "

## 2018-03-23 NOTE — TELEPHONE ENCOUNTER
Spoke with Pt. Pt stated Thursday 3/29/18 @ 2:30 will work for her to have cystoscope procedure done. Pt instructed to come to clinic office bldg 105 any 205. Pt verbalized understanding.

## 2018-03-23 NOTE — TELEPHONE ENCOUNTER
Attempted to contact pt about changing where she has her cystoscope procedure done and also date. Left message for pt to return call.

## 2018-03-28 ENCOUNTER — TELEPHONE (OUTPATIENT)
Dept: UROLOGY | Facility: CLINIC | Age: 38
End: 2018-03-28

## 2018-03-28 NOTE — TELEPHONE ENCOUNTER
----- Message from Alfredo BOWEN Frisard sent at 3/28/2018  3:21 PM CDT -----  Contact: same  Unsuccessful call placed to office.  Patient called in and stated she is scheduled for a Cysto tomorrow Thursday 3/29/18 but started her monthly cycle today so wanted to see if she could still have it done?  Patient call back number is [phone]

## 2018-03-28 NOTE — TELEPHONE ENCOUNTER
Spoke with Pt after speaking with Dr Yoon. Pt offered 4/12/18 @ 3pm. Pt agreed to that date and time. Dr Yoon to be advised.

## 2018-03-28 NOTE — TELEPHONE ENCOUNTER
Spoke with Pt. Pt offered 4/4/18 to do cystoscope. Pt stated she leaves this weekend for a cruise for a week. Pt stated she would be available the week after and will take off if need be. Dr Yoon to be advised.

## 2018-04-09 DIAGNOSIS — J32.9 RHINOSINUSITIS: ICD-10-CM

## 2018-04-09 RX ORDER — AZELASTINE 1 MG/ML
1 SPRAY, METERED NASAL 2 TIMES DAILY
Qty: 30 ML | Refills: 1 | Status: SHIPPED | OUTPATIENT
Start: 2018-04-09 | End: 2018-10-08

## 2018-04-12 ENCOUNTER — PROCEDURE VISIT (OUTPATIENT)
Dept: UROLOGY | Facility: CLINIC | Age: 38
End: 2018-04-12
Payer: COMMERCIAL

## 2018-04-12 VITALS
RESPIRATION RATE: 18 BRPM | DIASTOLIC BLOOD PRESSURE: 74 MMHG | SYSTOLIC BLOOD PRESSURE: 106 MMHG | HEIGHT: 64 IN | HEART RATE: 76 BPM | WEIGHT: 127 LBS | BODY MASS INDEX: 21.68 KG/M2

## 2018-04-12 DIAGNOSIS — R31.29 MICROHEMATURIA: Primary | ICD-10-CM

## 2018-04-12 PROCEDURE — 52000 CYSTOURETHROSCOPY: CPT | Mod: S$GLB,,, | Performed by: UROLOGY

## 2018-04-12 RX ORDER — SULFAMETHOXAZOLE AND TRIMETHOPRIM 800; 160 MG/1; MG/1
1 TABLET ORAL 2 TIMES DAILY
Qty: 6 TABLET | Refills: 0 | Status: SHIPPED | OUTPATIENT
Start: 2018-04-12 | End: 2018-04-19

## 2018-04-13 NOTE — PROCEDURES
Procedures CYSTOSCOPY    Cystoscopy Procedure Note:    PRE-PROCEDURE DIAGNOSIS:   1. Microhematuria         POST-PROCEDURE DIAGNOSIS: Same    PROCEDURE: Cystoscopy    INDICATION FOR PROCEDURE:   38 yo with microhematuria. CT concerning for L crossing vessel/possible UPJO but unobstructed on Mag3.  Persistent microhematuria since at least 2012 prompting treatment for suspected UTI's though has never been symptomatic except for once during a pregnancy.  Urine culture negative.  Here today for cystoscopy.  Urine again has 50 of blood on dipstick    ANESTHESIA: 2% lidocaine jelly urojet    SURGEON: Jaylen Yoon    EBL: NONE    PROCEDURE IN DETAIL:    After informed consent, the patient was prepped and drapped in standard cystoscopic fashion and 2% xylocaine jelly was instilled onto the urethral meatus and cystoscope. Time out was performed to verify correct patient and procedure. A flexible cystoscope was passed into the bladder via the urethra.     Urethra appeared normal without any lesions or narrowings, scant small urethral polyps.   The bladder was then systematically inspected. The ureteral orifices were normal in size and in the orthotopic position on the trigone with bilateral clear efflux noted. The bladder neck demonstrated good coaptation. The bladder mucosa, including the lateral walls, posterior wall, and dome were normal in appearance and free of any lesions or tumors. There was mild trigonitis/erythema near between bladder neck and trigone seen on retroflexion. Posterior bladder wall bulge of uterus noted.    Patient tolerated procedure well without complication.      DISPOSITION:  Largely negative cysto with mild nonspecific inflammatory findings.  Has never had gross hematuria  At this time has had full workup which is negative and she can follow up prn  Discussed the possibility of UPJO declaring itself in future but no evidence of obstruction now - may just be extrarenal pelvis.  RTC prn gross  hematuria, flank pain, etc  3d ppx abx Rxed

## 2018-04-17 DIAGNOSIS — F41.9 ANXIETY: ICD-10-CM

## 2018-04-17 RX ORDER — CITALOPRAM 20 MG/1
20 TABLET, FILM COATED ORAL DAILY
Qty: 90 TABLET | Refills: 1 | Status: SHIPPED | OUTPATIENT
Start: 2018-04-17 | End: 2018-10-08 | Stop reason: SDUPTHER

## 2018-06-07 DIAGNOSIS — N39.41 URGE INCONTINENCE OF URINE: ICD-10-CM

## 2018-06-07 RX ORDER — TOLTERODINE 2 MG/1
CAPSULE, EXTENDED RELEASE ORAL
Qty: 30 CAPSULE | Refills: 3 | Status: SHIPPED | OUTPATIENT
Start: 2018-06-07 | End: 2018-10-07 | Stop reason: SDUPTHER

## 2018-10-07 DIAGNOSIS — N39.41 URGE INCONTINENCE OF URINE: ICD-10-CM

## 2018-10-08 ENCOUNTER — OFFICE VISIT (OUTPATIENT)
Dept: FAMILY MEDICINE | Facility: CLINIC | Age: 38
End: 2018-10-08
Payer: COMMERCIAL

## 2018-10-08 VITALS
HEIGHT: 64 IN | WEIGHT: 124.5 LBS | BODY MASS INDEX: 21.25 KG/M2 | OXYGEN SATURATION: 98 % | DIASTOLIC BLOOD PRESSURE: 70 MMHG | RESPIRATION RATE: 18 BRPM | HEART RATE: 74 BPM | SYSTOLIC BLOOD PRESSURE: 118 MMHG

## 2018-10-08 DIAGNOSIS — Z23 NEED FOR DIPHTHERIA-TETANUS-PERTUSSIS (TDAP) VACCINE: ICD-10-CM

## 2018-10-08 DIAGNOSIS — Z23 NEED FOR INFLUENZA VACCINATION: ICD-10-CM

## 2018-10-08 DIAGNOSIS — F41.9 ANXIETY: ICD-10-CM

## 2018-10-08 DIAGNOSIS — Z00.00 ANNUAL PHYSICAL EXAM: Primary | ICD-10-CM

## 2018-10-08 PROCEDURE — 99395 PREV VISIT EST AGE 18-39: CPT | Mod: 25,,, | Performed by: NURSE PRACTITIONER

## 2018-10-08 PROCEDURE — 90686 IIV4 VACC NO PRSV 0.5 ML IM: CPT | Mod: ,,, | Performed by: NURSE PRACTITIONER

## 2018-10-08 PROCEDURE — 90471 IMMUNIZATION ADMIN: CPT | Mod: ,,, | Performed by: NURSE PRACTITIONER

## 2018-10-08 PROCEDURE — 90472 IMMUNIZATION ADMIN EACH ADD: CPT | Mod: ,,, | Performed by: NURSE PRACTITIONER

## 2018-10-08 PROCEDURE — 90715 TDAP VACCINE 7 YRS/> IM: CPT | Mod: ,,, | Performed by: NURSE PRACTITIONER

## 2018-10-08 RX ORDER — TOLTERODINE 2 MG/1
CAPSULE, EXTENDED RELEASE ORAL
Qty: 30 CAPSULE | Refills: 3 | Status: SHIPPED | OUTPATIENT
Start: 2018-10-08 | End: 2019-01-28 | Stop reason: SDUPTHER

## 2018-10-08 RX ORDER — CITALOPRAM 40 MG/1
40 TABLET, FILM COATED ORAL DAILY
Qty: 90 TABLET | Refills: 1 | Status: SHIPPED | OUTPATIENT
Start: 2018-10-08 | End: 2019-03-27 | Stop reason: SDUPTHER

## 2018-10-08 NOTE — PATIENT INSTRUCTIONS
Anxiety Reaction  Anxiety is the feeling we all get when we think something bad might happen. It is a normal response to stress and usually causes only a mild reaction. When anxiety becomes more severe, it can interfere with daily life. In some cases, you may not even be aware of what it is youre anxious about. There may also be a genetic link or it may be a learned behavior in the home.  Both psychological and physical triggers cause stress reaction. It's often a response to fear or emotional stress, real or imagined. This stress may come from home, family, work, or social relationships.  During an anxiety reaction, you may feel:  · Helpless  · Nervous  · Depressed  · Irritable  Your body may show signs of anxiety in many ways. You may experience:  · Dry mouth  · Shakiness  · Dizziness  · Weakness  · Trouble breathing  · Breathing fast (hyperventilating)  · Chest pressure  · Sweating  · Headache  · Nausea  · Diarrhea  · Tiredness  · Inability to sleep  · Sexual problems  Home care  · Try to locate the sources of stress in your life. They may not be obvious. These may include:  ¨ Daily hassles of life (traffic jams, missed appointments, car troubles, etc.)  ¨ Major life changes, both good (new baby, job promotion) and bad (loss of job, loss of loved one)  ¨ Overload: feeling that you have too many responsibilities and can't take care of all of them at once  ¨ Feeling helpless, feeling that your problems are beyond what youre able to solve  · Notice how your body reacts to stress. Learn to listen to your body signals. This will help you take action before the stress becomes severe.  · When you can, do something about the source of your stress. (Avoid hassles, limit the amount of change that happens in your life at one time and take a break when you feel overloaded).  · Unfortunately, many stressful situations can't be avoided. It is necessary to learn how to better manage stress. There are many proven methods  that will reduce your anxiety. These include simple things like exercise, good nutrition and adequate rest. Also, there are certain techniques that are helpful:  ¨ Relaxation  ¨ Breathing exercises  ¨ Visualization  ¨ Biofeedback  ¨ Meditation  For more information about this, consult your doctor or go to a local bookstore and review the many books and tapes available on this subject.  Follow-up care  If you feel that your anxiety is not responding to self-help measures, contact your doctor or make an appointment with a counselor. You may need short-term psychological counseling and temporary medicine to help you manage stress.  Call 911  Call your healthcare provider right away if any of these occur:  · Trouble breathing  · Confusion  · Drowsiness or trouble wakening  · Fainting or loss of consciousness  · Rapid heart rate  · Seizure  · New chest pain that becomes more severe, lasts longer, or spreads into your shoulder, arm, neck, jaw, or back  When to seek medical advice  Call your healthcare provider right away if any of these occur:  · Your symptoms get worse  · Severe headache not relieved by rest and mild pain reliever  Date Last Reviewed: 9/29/2015  © 5230-9491 [employer]. [address] [place] [address]. All rights reserved. This information is not intended as a substitute for professional medical care. Always follow your healthcare professional's instructions.        Depression  Depression is one of the most common mental health problems today. It is not just a state of unhappiness or sadness. It is a true disease. The cause seems to be related to a decrease in chemicals that transmit signals in the brain. Having a family history of depression, alcoholism, or suicide increases the risk. Chronic illness, chronic pain, migraine headaches and high emotional stress also increase the risk.  Depression is something we tend to recognize in others, but may have a hard time seeing in  ourselves. It can show in many physical and emotional ways:  · Loss of appetite  · Over-eating  · Not being able to sleep  · Sleeping too much  · Tiredness not related to physical exertion  · Restlessness or irritability  · Slowness of movement or speech  · Feeling depressed or withdrawn  · Loss of interest in things you once enjoyed  · Trouble concentrating, poor memory, trouble making decisions  · Thoughts of harming or killing oneself, or thoughts that life is not worth living  · Low self-esteem  The treatment for depression may include both medicine and psychotherapy. Antidepressants can reduce suffering and can improve the ability to function during the depressed period. Therapy can offer emotional support and help you understand emotional factors that may be causing the depression.  Home care  · On-going care and support helps people manage this disease.  Find a healthcare provider and therapist who meet your needs. Seek help when you feel like you may be getting ill.  · Be kind to yourself. Make it a point to do things that you enjoy (gardening, walking in nature, going to a movie, etc.). Reward yourself for small successes.  · Take care of your physical body. Eat a balanced diet (low in saturated fat and high in fruits and vegetables). Exercise at least 3 times a week for 30 minutes. Even mild-moderate exercise (like brisk walking) can make you feel better.  · Avoid alcohol, which can make depression worse.  · Take medicine as prescribed.  · Tell each of your healthcare providers about all of the prescription drugs, over-the-counter medicines, vitamins, and supplements you take. Certain supplements interact with medicines and can result in dangerous side effects. Ask your pharmacist when you have questions about drug interactions.  · Talk with your family and trusted friends about your feelings and thoughts. Ask them to help you recognize behavior changes early so you can get help and, if needed, medicine  can be adjusted.  Follow-up care  Follow up with your healthcare provider, or as advised.  Call 911  Call 911 if you:  · Have suicidal thoughts, a suicide plan, and the means to carry out the plan  · Have trouble breathing  · Are very confused  · Feel very drowsy or have trouble awakening  · Faint or lose consciousness  · Have new chest pain that becomes more severe, lasts longer, or spreads into your shoulder, arm, neck, jaw or back  When to seek medical advice  Call your healthcare provider right away if any of these occur:  · Feeling extreme depression, fear, anxiety, or anger toward yourself or others  · Feeling out of control  · Feeling that you may try to harm yourself or another  · Hearing voices that others do not hear  · Seeing things that others do not see  · Cant sleep or eat for 3 days in a row  · Friends or family express concern over your behavior and ask you to seek help  Date Last Reviewed: 9/29/2015  © 5940-2247 [employer]. [address] [place] [address], [place] [address]. All rights reserved. This information is not intended as a substitute for professional medical care. Always follow your healthcare professional's instructions.

## 2018-10-08 NOTE — PROGRESS NOTES
SUBJECTIVE:      Patient ID: Portia Cruz is a 37 y.o. female.    Chief Complaint: Annual Exam    Portia is here today for an annual exam.  She reports that since her last visit she does not feel that the Celexa is controlling her mood as well as it has in the past.  Her father was recently diagnosed with Lymphoma and she has been back and forth to [place] to help care for him.  Otherwise, she reports that she is doing well.    Anxiety   Presents for follow-up visit. Symptoms include decreased concentration, depressed mood, excessive worry, insomnia, irritability, malaise, muscle tension and nervous/anxious behavior. Patient reports no chest pain, compulsions, confusion, dizziness, dry mouth, feeling of choking, nausea, obsessions, palpitations, panic, restlessness, shortness of breath or suicidal ideas. Symptoms occur most days. The severity of symptoms is moderate. The quality of sleep is fair. Nighttime awakenings: occasional.           Past Surgical History:   Procedure Laterality Date    PATELLA REALIGNMENT  6/2013    Dr. Dueñas     Family History   Problem Relation Age of Onset    Diabetes Mother     Cancer Father         lymphoma      Social History     Socioeconomic History    Marital status:      Spouse name: None    Number of children: 2    Years of education: None    Highest education level: None   Social Needs    Financial resource strain: None    Food insecurity - worry: None    Food insecurity - inability: None    Transportation needs - medical: None    Transportation needs - non-medical: None   Occupational History     [employer]er: [employer]   Tobacco Use    Smoking status: Never Smoker    Smokeless tobacco: Never Used   Substance and Sexual Activity    Alcohol use: Yes     Alcohol/week: 0.0 oz     Comment: once a month    Drug use: No    Sexual activity: Yes     Partners: Male     Birth control/protection: Pill     Comment: No hx of STDs   Other Topics Concern    None    Social History Narrative    The patient does not exercise regularly ().    Rates diet as good.    She is satisfied with weight.                 Current Outpatient Medications   Medication Sig Dispense Refill    CETIRIZINE HCL (ZYRTEC ORAL) Take by mouth.      citalopram (CELEXA) 40 MG tablet Take 1 tablet (40 mg total) by mouth once daily. 90 tablet 1    fluticasone (FLONASE) 50 mcg/actuation nasal spray 1 spray by Each Nare route once daily.      magnesium glycinate 100 mg Tab Take by mouth once.      microfibrillar collagen powder Apply 1 g topically as needed.      tolterodine (DETROL LA) 2 MG Cp24 TAKE 1 CAPSULE BY MOUTH NIGHTLY. 30 capsule 3    PAZEO 0.7 % Drop 1 drop once daily.  1     No current facility-administered medications for this visit.      Review of patient's allergies indicates:   Allergen Reactions    Ativan [lorazepam]       Past Medical History:   Diagnosis Date    Dysthymic disorder     Postpartum depression      Past Surgical History:   Procedure Laterality Date    PATELLA REALIGNMENT  6/2013    Dr. Dueñas       Review of Systems   Constitutional: Positive for fatigue and irritability. Negative for activity change, appetite change, chills, diaphoresis, fever and unexpected weight change.   HENT: Negative for congestion, nosebleeds, rhinorrhea, sore throat and voice change.    Eyes: Negative for pain, discharge and visual disturbance.   Respiratory: Negative for apnea, cough, shortness of breath and wheezing.    Cardiovascular: Negative for chest pain and palpitations.   Gastrointestinal: Negative for abdominal pain, constipation, diarrhea, nausea and vomiting.   Endocrine: Negative for polydipsia, polyphagia and polyuria.   Genitourinary: Negative for difficulty urinating, dysuria, frequency, urgency and vaginal discharge.   Musculoskeletal: Negative for arthralgias, gait problem and myalgias.   Skin: Negative for color change, pallor and rash.   Allergic/Immunologic: Negative for  "immunocompromised state.   Neurological: Negative for dizziness, syncope, weakness, numbness and headaches.   Hematological: Negative for adenopathy.   Psychiatric/Behavioral: Positive for decreased concentration and dysphoric mood. Negative for confusion, self-injury and suicidal ideas. The patient is nervous/anxious and has insomnia.       OBJECTIVE:      Vitals:    10/08/18 1506   BP: 118/70   Pulse: 74   Resp: 18   SpO2: 98%   Weight: 56.5 kg (124 lb 8 oz)   Height: 5' 4" (1.626 m)     Physical Exam   Constitutional: She is oriented to person, place, and time. She appears well-developed and well-nourished. No distress.   HENT:   Head: Normocephalic and atraumatic.   Right Ear: Tympanic membrane, external ear and ear canal normal.   Left Ear: Tympanic membrane, external ear and ear canal normal.   Nose: Nose normal.   Mouth/Throat: Uvula is midline and oropharynx is clear and moist. No oropharyngeal exudate, posterior oropharyngeal edema or posterior oropharyngeal erythema.   Eyes: Conjunctivae, EOM and lids are normal. Pupils are equal, round, and reactive to light. Right eye exhibits no discharge. Left eye exhibits no discharge. No scleral icterus.   Neck: Normal range of motion. Neck supple. Carotid bruit is not present. No tracheal deviation present. No thyromegaly present.   Cardiovascular: Normal rate, regular rhythm, normal heart sounds and intact distal pulses. Exam reveals no gallop and no friction rub.   No murmur heard.  Pulmonary/Chest: Effort normal and breath sounds normal. No stridor. No respiratory distress. She has no wheezes. She has no rales.   Abdominal: Soft. Bowel sounds are normal. She exhibits no distension and no mass. There is no hepatosplenomegaly. There is no tenderness. There is no rigidity, no rebound, no guarding and no CVA tenderness.   Musculoskeletal: Normal range of motion. She exhibits no edema or tenderness.   Lymphadenopathy:     She has no cervical adenopathy. "   Neurological: She is alert and oriented to person, place, and time.   Skin: Skin is warm, dry and intact. Capillary refill takes less than 2 seconds. No rash noted. She is not diaphoretic. No erythema.   Psychiatric: She has a normal mood and affect. Her behavior is normal. Judgment and thought content normal. She expresses no suicidal plans.      Assessment:       1. Annual physical exam    2. Anxiety    3. Need for influenza vaccination    4. Need for diphtheria-tetanus-pertussis (Tdap) vaccine        Plan:       Annual physical exam  -     CBC auto differential; Future; Expected date: 10/08/2018  -     Comprehensive metabolic panel; Future; Expected date: 10/08/2018  -     Lipid panel; Future; Expected date: 10/08/2018  -     TSH; Future; Expected date: 10/08/2018  -     Urinalysis; Future    Anxiety   Increase celexa to 40mg daily; follow up if symptoms do not improve.  -     citalopram (CELEXA) 40 MG tablet; Take 1 tablet (40 mg total) by mouth once daily.  Dispense: 90 tablet; Refill: 1    Need for influenza vaccination  -     Influenza - Quadrivalent (3 years & older) (PF)    Need for diphtheria-tetanus-pertussis (Tdap) vaccine  -     (In Office Administered) Tdap Vaccine        Follow-up in about 6 months (around 4/8/2019) for med refill/depression/anxiety.      10/8/2018 THEA Amaro, FNP

## 2018-10-17 DIAGNOSIS — F41.9 ANXIETY: ICD-10-CM

## 2018-10-17 RX ORDER — CITALOPRAM 20 MG/1
20 TABLET, FILM COATED ORAL DAILY
Qty: 90 TABLET | Refills: 1 | Status: SHIPPED | OUTPATIENT
Start: 2018-10-17 | End: 2019-03-27 | Stop reason: SDUPTHER

## 2018-11-22 LAB
ALBUMIN SERPL-MCNC: 4.7 G/DL (ref 3.5–5.5)
ALBUMIN/GLOB SERPL: 1.7 {RATIO} (ref 1.2–2.2)
ALP SERPL-CCNC: 56 IU/L (ref 39–117)
ALT SERPL-CCNC: 16 IU/L (ref 0–32)
APPEARANCE UR: ABNORMAL
AST SERPL-CCNC: 18 IU/L (ref 0–40)
BACTERIA #/AREA URNS HPF: ABNORMAL /[HPF]
BASOPHILS # BLD AUTO: 0.1 X10E3/UL (ref 0–0.2)
BASOPHILS NFR BLD AUTO: 0 %
BILIRUB SERPL-MCNC: 0.2 MG/DL (ref 0–1.2)
BILIRUB UR QL STRIP: NEGATIVE
BUN SERPL-MCNC: 14 MG/DL (ref 6–20)
BUN/CREAT SERPL: 26 (ref 9–23)
CALCIUM SERPL-MCNC: 8.9 MG/DL (ref 8.7–10.2)
CHLORIDE SERPL-SCNC: 102 MMOL/L (ref 96–106)
CHOLEST SERPL-MCNC: 190 MG/DL (ref 100–199)
CO2 SERPL-SCNC: 26 MMOL/L (ref 20–29)
COLOR UR: YELLOW
CREAT SERPL-MCNC: 0.54 MG/DL (ref 0.57–1)
CRYSTALS URNS MICRO: ABNORMAL
EGFR IF AFRICAN AMERICAN: 140 ML/MIN/1.73
EOSINOPHIL # BLD AUTO: 0.3 X10E3/UL (ref 0–0.4)
EOSINOPHIL NFR BLD AUTO: 3 %
EPI CELLS #/AREA URNS HPF: >10 /HPF
ERYTHROCYTE [DISTWIDTH] IN BLOOD BY AUTOMATED COUNT: 12.5 % (ref 12.3–15.4)
EST. GFR  (NON AFRICAN AMERICAN): 122 ML/MIN/1.73
GLOBULIN SER CALC-MCNC: 2.8 G/DL (ref 1.5–4.5)
GLUCOSE SERPL-MCNC: 84 MG/DL (ref 65–99)
GLUCOSE UR QL: NEGATIVE
HCT VFR BLD AUTO: 38.5 % (ref 34–46.6)
HDLC SERPL-MCNC: 65 MG/DL
HGB BLD-MCNC: 12.5 G/DL (ref 11.1–15.9)
HGB UR QL STRIP: ABNORMAL
IMM GRANULOCYTES # BLD: 0 X10E3/UL (ref 0–0.1)
IMM GRANULOCYTES NFR BLD: 0 %
KETONES UR QL STRIP: NEGATIVE
LDLC SERPL CALC-MCNC: 111 MG/DL (ref 0–99)
LEUKOCYTE ESTERASE UR QL STRIP: ABNORMAL
LYMPHOCYTES # BLD AUTO: 2.1 X10E3/UL (ref 0.7–3.1)
LYMPHOCYTES NFR BLD AUTO: 17 %
MCH RBC QN AUTO: 29.6 PG (ref 26.6–33)
MCHC RBC AUTO-ENTMCNC: 32.5 G/DL (ref 31.5–35.7)
MCV RBC AUTO: 91 FL (ref 79–97)
MICRO URNS: ABNORMAL
MONOCYTES # BLD AUTO: 0.8 X10E3/UL (ref 0.1–0.9)
MONOCYTES NFR BLD AUTO: 6 %
MUCOUS THREADS URNS QL MICRO: PRESENT
NEUTROPHILS # BLD AUTO: 9.5 X10E3/UL (ref 1.4–7)
NEUTROPHILS NFR BLD AUTO: 74 %
NITRITE UR QL STRIP: NEGATIVE
PH UR STRIP: 7 [PH] (ref 5–7.5)
PLATELET # BLD AUTO: 270 X10E3/UL (ref 150–379)
POTASSIUM SERPL-SCNC: 4.1 MMOL/L (ref 3.5–5.2)
PROT SERPL-MCNC: 7.5 G/DL (ref 6–8.5)
PROT UR QL STRIP: NEGATIVE
RBC # BLD AUTO: 4.23 X10E6/UL (ref 3.77–5.28)
RBC #/AREA URNS HPF: ABNORMAL /HPF
SODIUM SERPL-SCNC: 140 MMOL/L (ref 134–144)
SP GR UR: 1.03 (ref 1–1.03)
TRIGL SERPL-MCNC: 68 MG/DL (ref 0–149)
TSH SERPL DL<=0.005 MIU/L-ACNC: 2.42 UIU/ML (ref 0.45–4.5)
UNIDENT CRYS URNS QL MICRO: PRESENT
UROBILINOGEN UR STRIP-MCNC: 0.2 MG/DL (ref 0.2–1)
VLDLC SERPL CALC-MCNC: 14 MG/DL (ref 5–40)
WBC # BLD AUTO: 12.8 X10E3/UL (ref 3.4–10.8)
WBC #/AREA URNS HPF: ABNORMAL /HPF

## 2018-11-27 ENCOUNTER — TELEPHONE (OUTPATIENT)
Dept: FAMILY MEDICINE | Facility: CLINIC | Age: 38
End: 2018-11-27

## 2018-11-27 DIAGNOSIS — D72.829 LEUKOCYTOSIS, UNSPECIFIED TYPE: Primary | ICD-10-CM

## 2019-01-28 DIAGNOSIS — N39.41 URGE INCONTINENCE OF URINE: ICD-10-CM

## 2019-01-28 RX ORDER — TOLTERODINE 2 MG/1
CAPSULE, EXTENDED RELEASE ORAL
Qty: 30 CAPSULE | Refills: 3 | Status: SHIPPED | OUTPATIENT
Start: 2019-01-28 | End: 2019-04-11

## 2019-03-27 DIAGNOSIS — F41.9 ANXIETY: ICD-10-CM

## 2019-03-27 RX ORDER — CITALOPRAM 40 MG/1
TABLET, FILM COATED ORAL
Qty: 90 TABLET | Refills: 1 | Status: SHIPPED | OUTPATIENT
Start: 2019-03-27 | End: 2019-09-28 | Stop reason: SDUPTHER

## 2019-04-11 ENCOUNTER — OFFICE VISIT (OUTPATIENT)
Dept: FAMILY MEDICINE | Facility: CLINIC | Age: 39
End: 2019-04-11
Payer: COMMERCIAL

## 2019-04-11 VITALS
DIASTOLIC BLOOD PRESSURE: 68 MMHG | HEART RATE: 94 BPM | RESPIRATION RATE: 18 BRPM | HEIGHT: 64 IN | TEMPERATURE: 99 F | SYSTOLIC BLOOD PRESSURE: 116 MMHG | WEIGHT: 124.19 LBS | OXYGEN SATURATION: 98 % | BODY MASS INDEX: 21.2 KG/M2

## 2019-04-11 DIAGNOSIS — J02.8 PHARYNGITIS DUE TO OTHER ORGANISM: Primary | ICD-10-CM

## 2019-04-11 PROCEDURE — 99213 PR OFFICE/OUTPT VISIT, EST, LEVL III, 20-29 MIN: ICD-10-PCS | Mod: ,,, | Performed by: FAMILY MEDICINE

## 2019-04-11 PROCEDURE — 99213 OFFICE O/P EST LOW 20 MIN: CPT | Mod: ,,, | Performed by: FAMILY MEDICINE

## 2019-04-11 RX ORDER — PENICILLIN V POTASSIUM 500 MG/1
500 TABLET, FILM COATED ORAL 2 TIMES DAILY
Qty: 20 TABLET | Refills: 0 | Status: SHIPPED | OUTPATIENT
Start: 2019-04-11 | End: 2019-10-15 | Stop reason: ALTCHOICE

## 2019-04-11 NOTE — PROGRESS NOTES
SUBJECTIVE:    Patient ID: Portia Cruz is a 38 y.o. female.    Chief Complaint: Fever and Sore Throat    HPI  37 yo female new to this provider presenst with a sore throat, scratcy voice and  Fever at home that has responded to Ibuprofen.  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    Fever +  Tonsilar exudates -  Anterior cervical lymphadenopathy +  Absence of cough +    Past Medical History:   Diagnosis Date    Dysthymic disorder     Postpartum depression      Social History     Socioeconomic History    Marital status:      Spouse name: Not on file    Number of children: 2    Years of education: Not on file    Highest education level: Not on file   Occupational History     Employer: [employer]   Social Needs    Financial resource strain: Not on file    Food insecurity:     Worry: Not on file     Inability: Not on file    Transportation needs:     Medical: Not on file     Non-medical: Not on file   Tobacco Use    Smoking status: Never Smoker    Smokeless tobacco: Never Used   Substance and Sexual Activity    Alcohol use: Yes     Alcohol/week: 0.0 oz     Comment: once a month    Drug use: No    Sexual activity: Yes     Partners: Male     Birth control/protection: Pill     Comment: No hx of STDs   Lifestyle    Physical activity:     Days per week: Not on file     Minutes per session: Not on file    Stress: Not on file   Relationships    Social connections:     Talks on phone: Not on file     Gets together: Not on file     Attends Islam service: Not on file     Active member of club or organization: Not on file     Attends meetings of clubs or organizations: Not on file     Relationship status: Not on file   Other Topics Concern    Not on file   Social History Narrative    The patient does not exercise regularly ().    Rates diet as good.    She is satisfied with weight.                 Past  "Surgical History:   Procedure Laterality Date    PATELLA REALIGNMENT  6/2013    Dr. Dueñas     Family History   Problem Relation Age of Onset    Diabetes Mother     Cancer Father         lymphoma     Current Outpatient Medications   Medication Sig Dispense Refill    CETIRIZINE HCL (ZYRTEC ORAL) Take by mouth.      citalopram (CELEXA) 40 MG tablet TAKE 1 TABLET BY MOUTH EVERY DAY 90 tablet 1    fluticasone (FLONASE) 50 mcg/actuation nasal spray 1 spray by Each Nare route once daily.      microfibrillar collagen powder Apply 1 g topically as needed.      penicillin v potassium (VEETID) 500 MG tablet Take 1 tablet (500 mg total) by mouth 2 (two) times daily. 20 tablet 0     No current facility-administered medications for this visit.      Review of patient's allergies indicates:   Allergen Reactions    Ativan [lorazepam]        Review of Systems   Constitutional: Positive for fever. Negative for activity change, appetite change, diaphoresis, fatigue and unexpected weight change.   HENT: Positive for sore throat. Negative for congestion, facial swelling, postnasal drip and trouble swallowing.    Respiratory: Negative for cough, chest tightness, shortness of breath and wheezing.    Cardiovascular: Negative for chest pain, palpitations and leg swelling.          Blood pressure 116/68, pulse 94, temperature 98.7 °F (37.1 °C), temperature source Oral, resp. rate 18, height 5' 4" (1.626 m), weight 56.3 kg (124 lb 3.2 oz), SpO2 98 %. Body mass index is 21.32 kg/m².   Objective:      Physical Exam   Constitutional: She is oriented to person, place, and time. She appears well-developed and well-nourished. No distress.   HENT:   Head: Normocephalic and atraumatic.   Right Ear: External ear normal.   Left Ear: External ear normal.   Mouth/Throat: No oropharyngeal exudate.   Beefy red oral pharynx     Eyes: Pupils are equal, round, and reactive to light. Conjunctivae and EOM are normal. Right eye exhibits no discharge. " Left eye exhibits no discharge.   Neck: Normal range of motion. No thyromegaly present.   Cardiovascular: Normal rate, regular rhythm and normal heart sounds.   No murmur heard.  Pulmonary/Chest: Effort normal and breath sounds normal. No respiratory distress. She has no wheezes.   Lymphadenopathy:     She has cervical adenopathy.   Neurological: She is alert and oriented to person, place, and time.   Skin: Skin is warm and dry. Capillary refill takes less than 2 seconds. No rash noted. She is not diaphoretic. No pallor.           Assessment:       1. Pharyngitis due to other organism         Plan:           Pharyngitis due to other organism  -     penicillin v potassium (VEETID) 500 MG tablet; Take 1 tablet (500 mg total) by mouth 2 (two) times daily.  Dispense: 20 tablet; Refill: 0    Suspect Strep pharyngitis, will treat as such pt was given a handwritten note to  Tylenol for pain and fever, use warm salt water gargles, and to return if her symptoms are not improving in 5-7 days or if her symptoms get worse.

## 2019-09-28 DIAGNOSIS — F41.9 ANXIETY: ICD-10-CM

## 2019-09-30 ENCOUNTER — TELEPHONE (OUTPATIENT)
Dept: FAMILY MEDICINE | Facility: CLINIC | Age: 39
End: 2019-09-30

## 2019-09-30 RX ORDER — CITALOPRAM 40 MG/1
TABLET, FILM COATED ORAL
Qty: 30 TABLET | Refills: 0 | Status: SHIPPED | OUTPATIENT
Start: 2019-09-30 | End: 2019-10-15 | Stop reason: SDUPTHER

## 2019-10-15 ENCOUNTER — OFFICE VISIT (OUTPATIENT)
Dept: FAMILY MEDICINE | Facility: CLINIC | Age: 39
End: 2019-10-15
Payer: COMMERCIAL

## 2019-10-15 VITALS
WEIGHT: 129.38 LBS | SYSTOLIC BLOOD PRESSURE: 107 MMHG | DIASTOLIC BLOOD PRESSURE: 72 MMHG | HEART RATE: 70 BPM | OXYGEN SATURATION: 98 % | HEIGHT: 64 IN | BODY MASS INDEX: 22.09 KG/M2 | TEMPERATURE: 99 F

## 2019-10-15 DIAGNOSIS — Z23 NEED FOR INFLUENZA VACCINATION: ICD-10-CM

## 2019-10-15 DIAGNOSIS — F41.9 ANXIETY: ICD-10-CM

## 2019-10-15 DIAGNOSIS — Z00.00 ANNUAL PHYSICAL EXAM: Primary | ICD-10-CM

## 2019-10-15 PROCEDURE — 90471 FLU VACCINE (QUAD) GREATER THAN OR EQUAL TO 3YO PRESERVATIVE FREE IM: ICD-10-PCS | Mod: S$GLB,,, | Performed by: NURSE PRACTITIONER

## 2019-10-15 PROCEDURE — 90686 IIV4 VACC NO PRSV 0.5 ML IM: CPT | Mod: S$GLB,,, | Performed by: NURSE PRACTITIONER

## 2019-10-15 PROCEDURE — 90686 FLU VACCINE (QUAD) GREATER THAN OR EQUAL TO 3YO PRESERVATIVE FREE IM: ICD-10-PCS | Mod: S$GLB,,, | Performed by: NURSE PRACTITIONER

## 2019-10-15 PROCEDURE — 99395 PREV VISIT EST AGE 18-39: CPT | Mod: 25,S$GLB,, | Performed by: NURSE PRACTITIONER

## 2019-10-15 PROCEDURE — 99395 PR PREVENTIVE VISIT,EST,18-39: ICD-10-PCS | Mod: 25,S$GLB,, | Performed by: NURSE PRACTITIONER

## 2019-10-15 PROCEDURE — 90471 IMMUNIZATION ADMIN: CPT | Mod: S$GLB,,, | Performed by: NURSE PRACTITIONER

## 2019-10-15 RX ORDER — CITALOPRAM 40 MG/1
40 TABLET, FILM COATED ORAL DAILY
Qty: 90 TABLET | Refills: 3 | Status: SHIPPED | OUTPATIENT
Start: 2019-10-15 | End: 2020-11-13

## 2019-10-15 RX ORDER — LORATADINE 10 MG/1
10 TABLET ORAL DAILY
COMMUNITY
End: 2022-12-28

## 2019-10-15 NOTE — PROGRESS NOTES
SUBJECTIVE:      Patient ID: Portia Cruz is a 38 y.o. female.    Chief Complaint: Annual Exam    Portia is for an annual wellness exam.  She reports she is doing well and denies new complaints since her last visit.  She continues to take Celexa to help control her mood and states she is doing well on that.  She wants to continue on it.  She is not exercising but is watching her diet.      Past Surgical History:   Procedure Laterality Date    PATELLA REALIGNMENT  6/2013    Dr. Dueñas     Family History   Problem Relation Age of Onset    Diabetes Mother     Cancer Father         lymphoma      Social History     Socioeconomic History    Marital status:      Spouse name: Not on file    Number of children: 2    Years of education: Not on file    Highest education level: Not on file   Occupational History     Employer: [employer]   Social Needs    Financial resource strain: Not on file    Food insecurity:     Worry: Not on file     Inability: Not on file    Transportation needs:     Medical: Not on file     Non-medical: Not on file   Tobacco Use    Smoking status: Never Smoker    Smokeless tobacco: Never Used   Substance and Sexual Activity    Alcohol use: Yes     Alcohol/week: 0.0 standard drinks     Comment: once a month    Drug use: No    Sexual activity: Yes     Partners: Male     Birth control/protection: Pill     Comment: No hx of STDs   Lifestyle    Physical activity:     Days per week: Not on file     Minutes per session: Not on file    Stress: Not on file   Relationships    Social connections:     Talks on phone: Not on file     Gets together: Not on file     Attends Christian service: Not on file     Active member of club or organization: Not on file     Attends meetings of clubs or organizations: Not on file     Relationship status: Not on file   Other Topics Concern    Not on file   Social History Narrative    The patient does not exercise regularly ().    Rates diet as  good.    She is satisfied with weight.                 Current Outpatient Medications   Medication Sig Dispense Refill    citalopram (CELEXA) 40 MG tablet Take 1 tablet (40 mg total) by mouth once daily. 90 tablet 3    fluticasone (FLONASE) 50 mcg/actuation nasal spray 1 spray by Each Nare route once daily.      loratadine (CLARITIN) 10 mg tablet Take 10 mg by mouth once daily.      microfibrillar collagen powder Apply 1 g topically as needed.       No current facility-administered medications for this visit.      Review of patient's allergies indicates:   Allergen Reactions    Ativan [lorazepam]       Past Medical History:   Diagnosis Date    Dysthymic disorder     Postpartum depression      Past Surgical History:   Procedure Laterality Date    PATELLA REALIGNMENT  6/2013    Dr. Dueñas       Review of Systems   Constitutional: Negative for activity change, appetite change, chills, diaphoresis, fatigue, fever and unexpected weight change.   HENT: Negative for congestion, nosebleeds, rhinorrhea, sore throat and voice change.    Eyes: Negative for pain, discharge and visual disturbance.   Respiratory: Negative for apnea, cough, shortness of breath and wheezing.    Cardiovascular: Negative for chest pain and palpitations.   Gastrointestinal: Negative for abdominal pain, constipation, diarrhea, nausea and vomiting.   Endocrine: Negative for polydipsia, polyphagia and polyuria.   Genitourinary: Negative for difficulty urinating, dysuria, frequency, menstrual problem, urgency and vaginal discharge.   Musculoskeletal: Negative for arthralgias, gait problem and myalgias.   Skin: Negative for color change, pallor and rash.   Allergic/Immunologic: Negative for immunocompromised state.   Neurological: Negative for dizziness, syncope, weakness, numbness and headaches.   Hematological: Negative for adenopathy. Does not bruise/bleed easily.   Psychiatric/Behavioral: Negative for confusion, dysphoric mood, self-injury,  "sleep disturbance and suicidal ideas. The patient is nervous/anxious.       OBJECTIVE:      Vitals:    10/15/19 1432   BP: 107/72   Pulse: 70   Temp: 98.6 °F (37 °C)   SpO2: 98%   Weight: 58.7 kg (129 lb 6.4 oz)   Height: 5' 4" (1.626 m)     Physical Exam   Constitutional: She is oriented to person, place, and time. She appears well-developed and well-nourished. No distress.   HENT:   Head: Normocephalic and atraumatic.   Right Ear: Tympanic membrane, external ear and ear canal normal.   Left Ear: Tympanic membrane, external ear and ear canal normal.   Nose: Nose normal. No mucosal edema or rhinorrhea.   Mouth/Throat: Uvula is midline and oropharynx is clear and moist. No oropharyngeal exudate, posterior oropharyngeal edema or posterior oropharyngeal erythema.   Eyes: Pupils are equal, round, and reactive to light. Conjunctivae, EOM and lids are normal. Right eye exhibits no discharge. Left eye exhibits no discharge. No scleral icterus.   Neck: Normal range of motion. Neck supple. Carotid bruit is not present. No tracheal deviation present. No thyromegaly present.   Cardiovascular: Normal rate, regular rhythm, normal heart sounds and intact distal pulses. Exam reveals no gallop and no friction rub.   No murmur heard.  Pulmonary/Chest: Effort normal and breath sounds normal. No stridor. No respiratory distress. She has no wheezes. She has no rales.   Abdominal: Soft. Bowel sounds are normal. She exhibits no distension and no mass. There is no hepatosplenomegaly. There is no tenderness. There is no rigidity, no rebound, no guarding and no CVA tenderness.   Musculoskeletal: Normal range of motion. She exhibits no edema.   Lymphadenopathy:     She has no cervical adenopathy.   Neurological: She is alert and oriented to person, place, and time.   Skin: Skin is warm, dry and intact. Capillary refill takes less than 2 seconds. No rash noted. She is not diaphoretic. No erythema.   Psychiatric: She has a normal mood and " affect. Her behavior is normal. Judgment and thought content normal. She expresses no suicidal plans.   Vitals reviewed.     Assessment:       1. Annual physical exam    2. Anxiety    3. Need for influenza vaccination        Plan:       Annual physical exam   Healthy lifestyle and recommended well screenings including immunizations reviewed with patient.  -     Lipid panel; Future; Expected date: 10/16/2019  -     Comprehensive metabolic panel; Future; Expected date: 10/16/2019  -     CBC auto differential; Future; Expected date: 10/16/2019  -     Urinalysis; Future; Expected date: 10/16/2019  -     TSH; Future; Expected date: 10/15/2019    Anxiety   Stable; continue current medications.  -     citalopram (CELEXA) 40 MG tablet; Take 1 tablet (40 mg total) by mouth once daily.  Dispense: 90 tablet; Refill: 3    Need for influenza vaccination  -     Influenza - Quadrivalent (PF)      Follow up in about 1 year (around 10/15/2020), or if symptoms worsen or fail to improve, for annual physical exam.      10/15/2019 THEA Amaro, FNP

## 2019-10-15 NOTE — PATIENT INSTRUCTIONS
Prevention Guidelines, Women Ages 18 to 39  Screening tests and vaccines are an important part of managing your health. Health counseling is essential, too. Below are guidelines for these, for women ages 18 to 39. Talk with your healthcare provider to make sure youre up-to-date on what you need.  Screening Who needs it How often   Alcohol misuse All women in this age group At routine exams   Blood pressure All women in this age group Every 2 years if your blood pressure is less than 120/80 mm Hg; yearly if your systolic blood pressure is 120 to 139 mm Hg, or your diastolic blood pressure reading is 80 to 89 mm Hg   Breast cancer All women in this age group should talk with their healthcare providers about the need for clinical breast exams (CBE)1 Clinical breast exam every 3 years1   Cervical cancer Women ages 21 and older Women between ages 21 and 29 should have a Pap test every 3 years; women between ages 30 and 65 are advised to have a Pap test plus an HPV test every 5 years   Chlamydia Sexually active women ages 24 and younger, and women at increased risk for infection Every 3 years if you're at risk or have symptoms   Depression All women in this age group At routine exams   Diabetes mellitus, type 2 Adults with no symptoms who are overweight or obese and have 1 or more other risk factors for diabetes At least every 3 years   Gonorrhea Sexually active women at increased risk for infection At routine exams   Hepatitis C Anyone at increased risk At routine exams   HIV All women At routine exams   Obesity All women in this age group At routine exams   Syphilis Women at increased risk for infection - talk with your healthcare provider At routine exams   Tuberculosis Women at increased risk for infection - talk with your healthcare provider Ask your healthcare provider   Vision All women in this age group At least 1 complete exam in your 20s, and 2 in your 30s   Vaccine2 Who needs it How often   Chickenpox  (varicella) All women in this age group who have no record of this infection or vaccine 2 doses; the second dose should be given 4 to 8 weeks after the first dose   Hepatitis A Women at increased risk for infection - talk with your healthcare provider 2 doses given at least 6 months apart   Hepatitis B Women at increased risk for infection - talk with your healthcare provider 3 doses over 6 months; second dose should be given 1 month after the first dose; the third dose should be given at least 2 months after the second dose and at least 4 months after the first dose   Haemophilus influenzae Type B (HIB) Women at increased risk for infection - talk with your healthcare provider 1 to 3 doses   Human papillomavirus (HPV) All women in this age group up to age 26 3 doses; the second dose should be given 1 to 2 months after the first dose and the third dose given 6 months after the first dose   Influenza (flu) All women in this age group Once a year   Measles, mumps, rubella (MMR) All women in this age group who have no record of these infections or vaccines 1 or 2 doses   Meningococcal Women at increased risk for infection - talk with your healthcare provider 1 or more doses   Pneumococcal conjugate vaccine (PCV13) and pneumococcal polysaccharide vaccine (PPSV23) Women at increased risk for infection - talk with your healthcare provider PCV13: 1 dose ages 19 to 65 (protects against 13 types of pneumococcal bacteria)  PPSV23: 1 to 2 doses through age 64, or 1 dose at 65 or older (protects against 23 types of pneumococcal bacteria)      Tetanus/diphtheria/pertussis (Td/Tdap) booster All women in this age group Td every 10 years, or a one-time dose of Tdap instead of a Td booster after age 18, then Td every 10 years   Counseling Who needs it How often   BRCA gene mutation testing for breast and ovarian cancer susceptibility Women with increased risk for having gene mutation When your risk is known   Breast cancer and  chemoprevention Women at high risk for breast cancer When your risk is known   Diet and exercise Women who are overweight or obese When diagnosed, and then at routine exams   Domestic violence Women at the age in which they are able to have children At routine exams   Sexually transmitted infection prevention Women who are sexually active At routine exams   Skin cancer Prevention of skin cancer in fair-skinned adults through age 24 At routine exams   Use of tobacco and the health effects it can cause All women in this age group Every visit   1According to the ACS, women ages 20 to 39 years should have a clinical breast exam (CBE) as part of their routine health exam every 3 years. Breast self-exams are an option for women starting in their 20s. But the  USPSTF does not recommend CBE.  2Those who are 18 years old and not up-to-date on their childhood vaccines should get all appropriate catch-up vaccines recommended by the CDC.  Date Last Reviewed: 8/27/2015  © 3060-5890 [employer] [address] [place] [address], [place] [address]. All rights reserved. This information is not intended as a substitute for professional medical care. Always follow your healthcare professional's instructions.

## 2019-11-13 ENCOUNTER — TELEPHONE (OUTPATIENT)
Dept: FAMILY MEDICINE | Facility: CLINIC | Age: 39
End: 2019-11-13

## 2019-11-13 LAB
ALBUMIN SERPL-MCNC: 4.2 G/DL (ref 3.5–5.5)
ALBUMIN/GLOB SERPL: 1.5 {RATIO} (ref 1.2–2.2)
ALP SERPL-CCNC: 42 IU/L (ref 39–117)
ALT SERPL-CCNC: 10 IU/L (ref 0–32)
APPEARANCE UR: CLEAR
AST SERPL-CCNC: 15 IU/L (ref 0–40)
BACTERIA #/AREA URNS HPF: NORMAL /[HPF]
BASOPHILS # BLD AUTO: 0 X10E3/UL (ref 0–0.2)
BASOPHILS NFR BLD AUTO: 1 %
BILIRUB SERPL-MCNC: 0.4 MG/DL (ref 0–1.2)
BILIRUB UR QL STRIP: NEGATIVE
BUN SERPL-MCNC: 12 MG/DL (ref 6–20)
BUN/CREAT SERPL: 21 (ref 9–23)
CALCIUM SERPL-MCNC: 8.7 MG/DL (ref 8.7–10.2)
CHLORIDE SERPL-SCNC: 102 MMOL/L (ref 96–106)
CHOLEST SERPL-MCNC: 184 MG/DL (ref 100–199)
CO2 SERPL-SCNC: 21 MMOL/L (ref 20–29)
COLOR UR: YELLOW
CREAT SERPL-MCNC: 0.58 MG/DL (ref 0.57–1)
EOSINOPHIL # BLD AUTO: 0.2 X10E3/UL (ref 0–0.4)
EOSINOPHIL NFR BLD AUTO: 4 %
EPI CELLS #/AREA URNS HPF: NORMAL /HPF (ref 0–10)
ERYTHROCYTE [DISTWIDTH] IN BLOOD BY AUTOMATED COUNT: 12.4 % (ref 12.3–15.4)
GLOBULIN SER CALC-MCNC: 2.8 G/DL (ref 1.5–4.5)
GLUCOSE SERPL-MCNC: 83 MG/DL (ref 65–99)
GLUCOSE UR QL: NEGATIVE
HCT VFR BLD AUTO: 37.4 % (ref 34–46.6)
HDLC SERPL-MCNC: 58 MG/DL
HGB BLD-MCNC: 12.3 G/DL (ref 11.1–15.9)
HGB UR QL STRIP: ABNORMAL
IMM GRANULOCYTES # BLD AUTO: 0 X10E3/UL (ref 0–0.1)
IMM GRANULOCYTES NFR BLD AUTO: 0 %
KETONES UR QL STRIP: NEGATIVE
LDLC SERPL CALC-MCNC: 113 MG/DL (ref 0–99)
LEUKOCYTE ESTERASE UR QL STRIP: NEGATIVE
LYMPHOCYTES # BLD AUTO: 1.8 X10E3/UL (ref 0.7–3.1)
LYMPHOCYTES NFR BLD AUTO: 30 %
MCH RBC QN AUTO: 29.9 PG (ref 26.6–33)
MCHC RBC AUTO-ENTMCNC: 32.9 G/DL (ref 31.5–35.7)
MCV RBC AUTO: 91 FL (ref 79–97)
MICRO URNS: ABNORMAL
MONOCYTES # BLD AUTO: 0.5 X10E3/UL (ref 0.1–0.9)
MONOCYTES NFR BLD AUTO: 8 %
MUCOUS THREADS URNS QL MICRO: PRESENT
NEUTROPHILS # BLD AUTO: 3.4 X10E3/UL (ref 1.4–7)
NEUTROPHILS NFR BLD AUTO: 57 %
NITRITE UR QL STRIP: NEGATIVE
PH UR STRIP: 6.5 [PH] (ref 5–7.5)
PLATELET # BLD AUTO: 235 X10E3/UL (ref 150–450)
POTASSIUM SERPL-SCNC: 4.1 MMOL/L (ref 3.5–5.2)
PROT SERPL-MCNC: 7 G/DL (ref 6–8.5)
PROT UR QL STRIP: ABNORMAL
RBC # BLD AUTO: 4.11 X10E6/UL (ref 3.77–5.28)
RBC #/AREA URNS HPF: NORMAL /HPF (ref 0–2)
SODIUM SERPL-SCNC: 138 MMOL/L (ref 134–144)
SP GR UR: 1.02 (ref 1–1.03)
TRIGL SERPL-MCNC: 64 MG/DL (ref 0–149)
TSH SERPL DL<=0.005 MIU/L-ACNC: 1.76 UIU/ML (ref 0.45–4.5)
UROBILINOGEN UR STRIP-MCNC: 0.2 MG/DL (ref 0.2–1)
VLDLC SERPL CALC-MCNC: 13 MG/DL (ref 5–40)
WBC # BLD AUTO: 5.9 X10E3/UL (ref 3.4–10.8)
WBC #/AREA URNS HPF: NORMAL /HPF (ref 0–5)

## 2020-11-17 ENCOUNTER — OFFICE VISIT (OUTPATIENT)
Dept: FAMILY MEDICINE | Facility: CLINIC | Age: 40
End: 2020-11-17
Payer: COMMERCIAL

## 2020-11-17 VITALS
TEMPERATURE: 98 F | SYSTOLIC BLOOD PRESSURE: 118 MMHG | WEIGHT: 130.69 LBS | HEART RATE: 86 BPM | OXYGEN SATURATION: 98 % | RESPIRATION RATE: 16 BRPM | HEIGHT: 64 IN | BODY MASS INDEX: 22.31 KG/M2 | DIASTOLIC BLOOD PRESSURE: 70 MMHG

## 2020-11-17 DIAGNOSIS — F41.9 ANXIETY: Primary | ICD-10-CM

## 2020-11-17 DIAGNOSIS — Z13.6 ENCOUNTER FOR LIPID SCREENING FOR CARDIOVASCULAR DISEASE: ICD-10-CM

## 2020-11-17 DIAGNOSIS — Z13.220 ENCOUNTER FOR LIPID SCREENING FOR CARDIOVASCULAR DISEASE: ICD-10-CM

## 2020-11-17 DIAGNOSIS — Z13.1 DIABETES MELLITUS SCREENING: ICD-10-CM

## 2020-11-17 PROCEDURE — 99213 OFFICE O/P EST LOW 20 MIN: CPT | Mod: S$GLB,,, | Performed by: FAMILY MEDICINE

## 2020-11-17 PROCEDURE — 99213 PR OFFICE/OUTPT VISIT, EST, LEVL III, 20-29 MIN: ICD-10-PCS | Mod: S$GLB,,, | Performed by: FAMILY MEDICINE

## 2020-11-17 RX ORDER — CITALOPRAM 40 MG/1
40 TABLET, FILM COATED ORAL DAILY
Qty: 90 TABLET | Refills: 3 | Status: SHIPPED | OUTPATIENT
Start: 2020-11-17 | End: 2021-12-12

## 2020-11-17 RX ORDER — SPIRONOLACTONE 25 MG/1
25 TABLET ORAL DAILY
COMMUNITY
Start: 2020-10-10 | End: 2022-12-28

## 2020-11-17 NOTE — PROGRESS NOTES
SUBJECTIVE:    Patient ID: Portia Cruz is a 40 y.o. female.    Chief Complaint: Anxiety    39 yo female new to this provider she was previously being followed by nurse practitioner Soila she is here today to follow-up on her anxiety she has been on Celexa for the last several years, patient thinks she had previously been on 20 mg, was increased to 40 mg with the following diagnosis of cancer and she states that she has been very stable on this medication.    , 2 children (boys girl) .    SPMHx  Anxiety: Celexa 40mg  Allergic Rhinitis: Flonase, Claritin 10mg      Specialists  Derm: Dr Andersen  GYN: Dr Darling      Smoke: None  ETOH: 1 a month  Exercise: None      Anxiety  Presents for follow-up visit. Patient reports no chest pain, compulsions, confusion, decreased concentration, depressed mood, dizziness, dry mouth, excessive worry, feeling of choking, hyperventilation, impotence, insomnia, irritability, malaise, muscle tension, nausea, nervous/anxious behavior, obsessions, palpitations, panic, restlessness, shortness of breath or suicidal ideas. Symptoms occur rarely. The severity of symptoms is mild.             Past Medical History:   Diagnosis Date    Dysthymic disorder     Postpartum depression      Social History     Socioeconomic History    Marital status:      Spouse name: Not on file    Number of children: 2    Years of education: Not on file    Highest education level: Not on file   Occupational History     Employer: [employer]   Social Needs    Financial resource strain: Not very hard    Food insecurity     Worry: Never true     Inability: Never true    Transportation needs     Medical: No     Non-medical: No   Tobacco Use    Smoking status: Never Smoker    Smokeless tobacco: Never Used   Substance and Sexual Activity    Alcohol use: Yes     Alcohol/week: 0.0 standard drinks     Frequency: Monthly or less     Drinks per session: 1 or 2     Binge frequency:  Never     Comment: once a month    Drug use: No    Sexual activity: Yes     Partners: Male     Birth control/protection: Pill     Comment: No hx of STDs   Lifestyle    Physical activity     Days per week: 0 days     Minutes per session: Not on file    Stress: To some extent   Relationships    Social connections     Talks on phone: More than three times a week     Gets together: Once a week     Attends Restoration service: Not on file     Active member of club or organization: No     Attends meetings of clubs or organizations: Not on file     Relationship status:    Other Topics Concern    Not on file   Social History Narrative    The patient does not exercise regularly ().    Rates diet as good.    She is satisfied with weight.                 Past Surgical History:   Procedure Laterality Date    PATELLA REALIGNMENT  6/2013    Dr. Dueñas     Family History   Problem Relation Age of Onset    Diabetes Mother     Cancer Father         lymphoma     Current Outpatient Medications   Medication Sig Dispense Refill    citalopram (CELEXA) 40 MG tablet Take 1 tablet (40 mg total) by mouth once daily. 90 tablet 3    fluticasone (FLONASE) 50 mcg/actuation nasal spray 1 spray by Each Nare route once daily.      loratadine (CLARITIN) 10 mg tablet Take 10 mg by mouth once daily.      spironolactone (ALDACTONE) 25 MG tablet Take 25 mg by mouth once daily.       No current facility-administered medications for this visit.      Review of patient's allergies indicates:   Allergen Reactions    Ativan [lorazepam]        Review of Systems   Constitutional: Negative for activity change, appetite change, fatigue, irritability and unexpected weight change.   HENT: Negative for congestion, ear pain, hearing loss, postnasal drip, rhinorrhea, sinus pressure, sinus pain, sneezing, sore throat and trouble swallowing.    Eyes: Negative for photophobia, pain, discharge and visual disturbance.   Respiratory: Negative for cough,  "chest tightness, shortness of breath and wheezing.    Cardiovascular: Negative for chest pain, palpitations and leg swelling.   Gastrointestinal: Negative for abdominal distention, abdominal pain, blood in stool, constipation, diarrhea, nausea and vomiting.   Endocrine: Negative for cold intolerance, heat intolerance, polydipsia and polyuria.   Genitourinary: Negative for difficulty urinating, dysuria, flank pain, frequency, hematuria, impotence, menstrual problem, pelvic pain and urgency.   Musculoskeletal: Negative for arthralgias, back pain, joint swelling, myalgias and neck pain.   Skin: Negative for pallor.   Allergic/Immunologic: Negative for environmental allergies and food allergies.   Neurological: Negative for dizziness, weakness, light-headedness, numbness and headaches.   Hematological: Does not bruise/bleed easily.   Psychiatric/Behavioral: Negative for agitation, confusion, decreased concentration, dysphoric mood, sleep disturbance and suicidal ideas. The patient is not nervous/anxious and does not have insomnia.           Blood pressure 118/70, pulse 86, temperature 97.9 °F (36.6 °C), resp. rate 16, height 5' 4" (1.626 m), weight 59.3 kg (130 lb 11.2 oz), SpO2 98 %. Body mass index is 22.43 kg/m².   Objective:      Physical Exam  Constitutional:       General: She is not in acute distress.     Appearance: Normal appearance. She is well-developed. She is not ill-appearing or toxic-appearing.   HENT:      Head: Normocephalic and atraumatic.      Right Ear: External ear normal.      Left Ear: External ear normal.      Nose: Nose normal. No congestion or rhinorrhea.      Mouth/Throat:      Mouth: Mucous membranes are moist.      Pharynx: Oropharynx is clear. No oropharyngeal exudate or posterior oropharyngeal erythema.   Eyes:      General:         Right eye: No discharge.         Left eye: No discharge.      Conjunctiva/sclera: Conjunctivae normal.      Pupils: Pupils are equal, round, and reactive to " light.   Cardiovascular:      Rate and Rhythm: Normal rate and regular rhythm.      Heart sounds: Normal heart sounds. No murmur.   Pulmonary:      Effort: Pulmonary effort is normal. No respiratory distress.      Breath sounds: Normal breath sounds.   Abdominal:      General: Abdomen is flat. There is no distension.      Palpations: Abdomen is soft.      Tenderness: There is no abdominal tenderness. There is no guarding.   Skin:     General: Skin is warm and dry.      Capillary Refill: Capillary refill takes less than 2 seconds.   Neurological:      Mental Status: She is alert and oriented to person, place, and time.             Assessment:       1. Anxiety    2. Encounter for lipid screening for cardiovascular disease    3. Diabetes mellitus screening         Plan:           Anxiety  -     citalopram (CELEXA) 40 MG tablet; Take 1 tablet (40 mg total) by mouth once daily.  Dispense: 90 tablet; Refill: 3    Encounter for lipid screening for cardiovascular disease  -     Lipid Panel; Future; Expected date: 11/17/2020    Diabetes mellitus screening  -     Comprehensive Metabolic Panel; Future; Expected date: 11/17/2020

## 2021-01-14 LAB
CHOLEST SERPL-MCNC: 201 MG/DL
CHOLEST/HDLC SERPL: 3.5 (CALC)
HDLC SERPL-MCNC: 57 MG/DL
LDLC SERPL CALC-MCNC: 128 MG/DL (CALC)
NONHDLC SERPL-MCNC: 144 MG/DL (CALC)
TRIGL SERPL-MCNC: 66 MG/DL

## 2021-03-27 ENCOUNTER — HOSPITAL ENCOUNTER (EMERGENCY)
Facility: HOSPITAL | Age: 41
Discharge: HOME | End: 2021-03-27
Attending: EMERGENCY MEDICINE
Payer: COMMERCIAL

## 2021-03-27 VITALS
WEIGHT: 125 LBS | TEMPERATURE: 98 F | OXYGEN SATURATION: 100 % | RESPIRATION RATE: 14 BRPM | HEIGHT: 64 IN | SYSTOLIC BLOOD PRESSURE: 106 MMHG | BODY MASS INDEX: 21.34 KG/M2 | DIASTOLIC BLOOD PRESSURE: 56 MMHG | HEART RATE: 74 BPM

## 2021-03-27 DIAGNOSIS — R10.9 RIGHT SIDED ABDOMINAL PAIN: Primary | ICD-10-CM

## 2021-03-27 DIAGNOSIS — R10.9 ABDOMINAL PAIN: ICD-10-CM

## 2021-03-27 LAB
ALBUMIN SERPL BCP-MCNC: 4.3 G/DL (ref 3.5–5.2)
ALP SERPL-CCNC: 44 U/L (ref 55–135)
ALT SERPL W/O P-5'-P-CCNC: 15 U/L (ref 10–44)
ANION GAP SERPL CALC-SCNC: 12 MMOL/L (ref 8–16)
ANION GAP SERPL CALC-SCNC: 6 MMOL/L (ref 8–16)
AST SERPL-CCNC: 19 U/L (ref 10–40)
B-HCG UR QL: NEGATIVE
BACTERIA #/AREA URNS HPF: NEGATIVE /HPF
BASOPHILS # BLD AUTO: 0.11 K/UL (ref 0–0.2)
BASOPHILS NFR BLD: 0.7 % (ref 0–1.9)
BILIRUB SERPL-MCNC: 0.6 MG/DL (ref 0.1–1)
BILIRUB UR QL STRIP: NEGATIVE
BUN SERPL-MCNC: 12 MG/DL (ref 6–20)
BUN SERPL-MCNC: 15 MG/DL (ref 6–20)
CALCIUM SERPL-MCNC: 8.3 MG/DL (ref 8.7–10.5)
CALCIUM SERPL-MCNC: 8.8 MG/DL (ref 8.7–10.5)
CHLORIDE SERPL-SCNC: 104 MMOL/L (ref 95–110)
CHLORIDE SERPL-SCNC: 108 MMOL/L (ref 95–110)
CLARITY UR: CLEAR
CO2 SERPL-SCNC: 18 MMOL/L (ref 23–29)
CO2 SERPL-SCNC: 25 MMOL/L (ref 23–29)
COLOR UR: YELLOW
CREAT SERPL-MCNC: 0.4 MG/DL (ref 0.5–1.4)
CREAT SERPL-MCNC: 0.6 MG/DL (ref 0.5–1.4)
CTP QC/QA: YES
DIFFERENTIAL METHOD: ABNORMAL
EOSINOPHIL # BLD AUTO: 0.6 K/UL (ref 0–0.5)
EOSINOPHIL NFR BLD: 3.8 % (ref 0–8)
ERYTHROCYTE [DISTWIDTH] IN BLOOD BY AUTOMATED COUNT: 11.8 % (ref 11.5–14.5)
EST. GFR  (AFRICAN AMERICAN): >60 ML/MIN/1.73 M^2
EST. GFR  (AFRICAN AMERICAN): >60 ML/MIN/1.73 M^2
EST. GFR  (NON AFRICAN AMERICAN): >60 ML/MIN/1.73 M^2
EST. GFR  (NON AFRICAN AMERICAN): >60 ML/MIN/1.73 M^2
GLUCOSE SERPL-MCNC: 153 MG/DL (ref 70–110)
GLUCOSE SERPL-MCNC: 97 MG/DL (ref 70–110)
GLUCOSE UR QL STRIP: NEGATIVE
HCT VFR BLD AUTO: 40.4 % (ref 37–48.5)
HGB BLD-MCNC: 13.3 G/DL (ref 12–16)
HGB UR QL STRIP: ABNORMAL
HYALINE CASTS #/AREA URNS LPF: 11 /LPF
IMM GRANULOCYTES # BLD AUTO: 0.04 K/UL (ref 0–0.04)
IMM GRANULOCYTES NFR BLD AUTO: 0.3 % (ref 0–0.5)
KETONES UR QL STRIP: ABNORMAL
LACTATE SERPL-SCNC: 1.2 MMOL/L (ref 0.5–1.9)
LACTATE SERPL-SCNC: 2.4 MMOL/L (ref 0.5–1.9)
LACTATE SERPL-SCNC: 5.2 MMOL/L (ref 0.5–1.9)
LEUKOCYTE ESTERASE UR QL STRIP: ABNORMAL
LIPASE SERPL-CCNC: 46 U/L (ref 4–60)
LYMPHOCYTES # BLD AUTO: 7.4 K/UL (ref 1–4.8)
LYMPHOCYTES NFR BLD: 49.6 % (ref 18–48)
MCH RBC QN AUTO: 29.6 PG (ref 27–31)
MCHC RBC AUTO-ENTMCNC: 32.9 G/DL (ref 32–36)
MCV RBC AUTO: 90 FL (ref 82–98)
MICROSCOPIC COMMENT: ABNORMAL
MONOCYTES # BLD AUTO: 1.1 K/UL (ref 0.3–1)
MONOCYTES NFR BLD: 7.5 % (ref 4–15)
NEUTROPHILS # BLD AUTO: 5.6 K/UL (ref 1.8–7.7)
NEUTROPHILS NFR BLD: 38.1 % (ref 38–73)
NITRITE UR QL STRIP: NEGATIVE
NRBC BLD-RTO: 0 /100 WBC
PH UR STRIP: 8 [PH] (ref 5–8)
PLATELET # BLD AUTO: 351 K/UL (ref 150–350)
PMV BLD AUTO: 10.5 FL (ref 9.2–12.9)
POTASSIUM SERPL-SCNC: 3.1 MMOL/L (ref 3.5–5.1)
POTASSIUM SERPL-SCNC: 3.9 MMOL/L (ref 3.5–5.1)
PROT SERPL-MCNC: 8 G/DL (ref 6–8.4)
PROT UR QL STRIP: NEGATIVE
RBC # BLD AUTO: 4.49 M/UL (ref 4–5.4)
RBC #/AREA URNS HPF: 7 /HPF (ref 0–4)
SODIUM SERPL-SCNC: 134 MMOL/L (ref 136–145)
SODIUM SERPL-SCNC: 139 MMOL/L (ref 136–145)
SP GR UR STRIP: 1.01 (ref 1–1.03)
SQUAMOUS #/AREA URNS HPF: 5 /HPF
URN SPEC COLLECT METH UR: ABNORMAL
UROBILINOGEN UR STRIP-ACNC: NEGATIVE EU/DL
WBC # BLD AUTO: 14.85 K/UL (ref 3.9–12.7)
WBC #/AREA URNS HPF: 3 /HPF (ref 0–5)

## 2021-03-27 PROCEDURE — 96374 THER/PROPH/DIAG INJ IV PUSH: CPT

## 2021-03-27 PROCEDURE — 63600175 PHARM REV CODE 636 W HCPCS: Performed by: STUDENT IN AN ORGANIZED HEALTH CARE EDUCATION/TRAINING PROGRAM

## 2021-03-27 PROCEDURE — 93005 ELECTROCARDIOGRAM TRACING: CPT | Performed by: GENERAL PRACTICE

## 2021-03-27 PROCEDURE — 99285 EMERGENCY DEPT VISIT HI MDM: CPT | Mod: 25

## 2021-03-27 PROCEDURE — 25000003 PHARM REV CODE 250: Performed by: STUDENT IN AN ORGANIZED HEALTH CARE EDUCATION/TRAINING PROGRAM

## 2021-03-27 PROCEDURE — 96375 TX/PRO/DX INJ NEW DRUG ADDON: CPT

## 2021-03-27 PROCEDURE — 63600175 PHARM REV CODE 636 W HCPCS: Performed by: EMERGENCY MEDICINE

## 2021-03-27 PROCEDURE — 85025 COMPLETE CBC W/AUTO DIFF WBC: CPT | Performed by: STUDENT IN AN ORGANIZED HEALTH CARE EDUCATION/TRAINING PROGRAM

## 2021-03-27 PROCEDURE — 96361 HYDRATE IV INFUSION ADD-ON: CPT

## 2021-03-27 PROCEDURE — 81001 URINALYSIS AUTO W/SCOPE: CPT | Performed by: STUDENT IN AN ORGANIZED HEALTH CARE EDUCATION/TRAINING PROGRAM

## 2021-03-27 PROCEDURE — 83605 ASSAY OF LACTIC ACID: CPT | Mod: 91 | Performed by: STUDENT IN AN ORGANIZED HEALTH CARE EDUCATION/TRAINING PROGRAM

## 2021-03-27 PROCEDURE — 80053 COMPREHEN METABOLIC PANEL: CPT | Performed by: STUDENT IN AN ORGANIZED HEALTH CARE EDUCATION/TRAINING PROGRAM

## 2021-03-27 PROCEDURE — 81025 URINE PREGNANCY TEST: CPT | Performed by: STUDENT IN AN ORGANIZED HEALTH CARE EDUCATION/TRAINING PROGRAM

## 2021-03-27 PROCEDURE — 83690 ASSAY OF LIPASE: CPT | Performed by: STUDENT IN AN ORGANIZED HEALTH CARE EDUCATION/TRAINING PROGRAM

## 2021-03-27 PROCEDURE — 80048 BASIC METABOLIC PNL TOTAL CA: CPT | Performed by: STUDENT IN AN ORGANIZED HEALTH CARE EDUCATION/TRAINING PROGRAM

## 2021-03-27 RX ORDER — HYDROMORPHONE HYDROCHLORIDE 1 MG/ML
0.5 INJECTION, SOLUTION INTRAMUSCULAR; INTRAVENOUS; SUBCUTANEOUS
Status: COMPLETED | OUTPATIENT
Start: 2021-03-27 | End: 2021-03-27

## 2021-03-27 RX ORDER — ONDANSETRON 2 MG/ML
4 INJECTION INTRAMUSCULAR; INTRAVENOUS
Status: COMPLETED | OUTPATIENT
Start: 2021-03-27 | End: 2021-03-27

## 2021-03-27 RX ORDER — ONDANSETRON 4 MG/1
4 TABLET, ORALLY DISINTEGRATING ORAL
Status: DISCONTINUED | OUTPATIENT
Start: 2021-03-27 | End: 2021-03-27

## 2021-03-27 RX ADMIN — ONDANSETRON 4 MG: 2 INJECTION INTRAMUSCULAR; INTRAVENOUS at 02:03

## 2021-03-27 RX ADMIN — SODIUM CHLORIDE 1000 ML: 9 INJECTION, SOLUTION INTRAVENOUS at 02:03

## 2021-03-27 RX ADMIN — HYDROMORPHONE HYDROCHLORIDE 0.5 MG: 1 INJECTION, SOLUTION INTRAMUSCULAR; INTRAVENOUS; SUBCUTANEOUS at 02:03

## 2021-04-13 NOTE — TELEPHONE ENCOUNTER
----- Message from NIMCO Ramirez sent at 11/26/2018  9:13 AM CST -----  Still has blood in her urine.  She also has crystals in her urine (microscopic), which are not concerning.  Her workup for hematuria has been negative in the past.  WBC is also elevated on blood work.  Usually due to infection.  Recheck CBC in a month to make sure it is stable (it was normal last year).  Otherwise, labs look OK   Discussed AREDS supplements, BP Control, and dark leafy green vegetables.

## 2021-07-07 ENCOUNTER — OFFICE VISIT (OUTPATIENT)
Dept: FAMILY MEDICINE | Facility: CLINIC | Age: 41
End: 2021-07-07
Payer: COMMERCIAL

## 2021-07-07 VITALS
OXYGEN SATURATION: 98 % | RESPIRATION RATE: 18 BRPM | TEMPERATURE: 98 F | SYSTOLIC BLOOD PRESSURE: 116 MMHG | HEIGHT: 64 IN | DIASTOLIC BLOOD PRESSURE: 70 MMHG | HEART RATE: 84 BPM | BODY MASS INDEX: 22.23 KG/M2 | WEIGHT: 130.19 LBS

## 2021-07-07 DIAGNOSIS — H69.92 DYSFUNCTION OF LEFT EUSTACHIAN TUBE: Primary | ICD-10-CM

## 2021-07-07 PROCEDURE — 99213 OFFICE O/P EST LOW 20 MIN: CPT | Mod: S$GLB,,, | Performed by: FAMILY MEDICINE

## 2021-07-07 PROCEDURE — 99213 PR OFFICE/OUTPT VISIT, EST, LEVL III, 20-29 MIN: ICD-10-PCS | Mod: S$GLB,,, | Performed by: FAMILY MEDICINE

## 2021-07-07 RX ORDER — MONTELUKAST SODIUM 10 MG/1
10 TABLET ORAL NIGHTLY
Qty: 30 TABLET | Refills: 0 | Status: SHIPPED | OUTPATIENT
Start: 2021-07-07 | End: 2021-08-05

## 2021-07-07 RX ORDER — CETIRIZINE HYDROCHLORIDE 10 MG/1
10 TABLET ORAL DAILY
COMMUNITY

## 2021-07-07 RX ORDER — AZITHROMYCIN 250 MG/1
TABLET, FILM COATED ORAL
Qty: 6 TABLET | Refills: 0 | Status: SHIPPED | OUTPATIENT
Start: 2021-07-07 | End: 2021-07-12

## 2021-07-07 RX ORDER — AZELASTINE 1 MG/ML
1 SPRAY, METERED NASAL 2 TIMES DAILY
Qty: 30 ML | Refills: 0 | Status: SHIPPED | OUTPATIENT
Start: 2021-07-07 | End: 2021-08-05

## 2021-10-28 ENCOUNTER — TELEPHONE (OUTPATIENT)
Dept: FAMILY MEDICINE | Facility: CLINIC | Age: 41
End: 2021-10-28
Payer: COMMERCIAL

## 2021-10-28 ENCOUNTER — PATIENT MESSAGE (OUTPATIENT)
Dept: FAMILY MEDICINE | Facility: CLINIC | Age: 41
End: 2021-10-28
Payer: COMMERCIAL

## 2022-12-27 NOTE — PROGRESS NOTES
SUBJECTIVE:    Patient ID: Portia Cruz is a 42 y.o. female.    Chief Complaint: Anxiety  41 yo female here for an annual exam in for medication refills     , 2 children (boys girl) .     SPMHx  Anxiety: Celexa 40mg  Allergic Rhinitis: Flonase, Claritin 10mg        Specialists  Derm: Dr Andersen  GYN: Dr Darling        Smoke: None  ETOH: 1 a month  Exercise: None     Anxiety  Presents for follow-up visit. Patient reports no chest pain, confusion, dizziness, insomnia, nervous/anxious behavior, palpitations, panic, restlessness, shortness of breath or suicidal ideas. Symptoms occur occasionally. The severity of symptoms is mild.     Compliance with medications is %.       Past Medical History:   Diagnosis Date    Dysthymic disorder     Postpartum depression      Social History     Socioeconomic History    Marital status:     Number of children: 2   Occupational History     Employer: [employer]   Tobacco Use    Smoking status: Never    Smokeless tobacco: Never   Substance and Sexual Activity    Alcohol use: Yes     Alcohol/week: 0.0 standard drinks     Comment: once a month    Drug use: No    Sexual activity: Yes     Partners: Male     Birth control/protection: Pill     Comment: No hx of STDs   Social History Narrative    The patient does not exercise regularly ().    Rates diet as good.    She is satisfied with weight.                 Social Determinants of Health     Financial Resource Strain: Low Risk     Difficulty of Paying Living Expenses: Not hard at all   Food Insecurity: No Food Insecurity    Worried About Running Out of Food in the Last Year: Never true    Ran Out of Food in the Last Year: Never true   Transportation Needs: No Transportation Needs    Lack of Transportation (Medical): No    Lack of Transportation (Non-Medical): No   Physical Activity: Inactive    Days of Exercise per Week: 0 days    Minutes of Exercise per Session: 0 min   Stress: Stress Concern Present     Feeling of Stress : To some extent   Social Connections: Unknown    Frequency of Communication with Friends and Family: More than three times a week    Frequency of Social Gatherings with Friends and Family: Once a week    Active Member of Clubs or Organizations: No    Attends Club or Organization Meetings: Never    Marital Status:    Housing Stability: Low Risk     Unable to Pay for Housing in the Last Year: No    Number of Places Lived in the Last Year: 1    Unstable Housing in the Last Year: No     Past Surgical History:   Procedure Laterality Date    PATELLA REALIGNMENT  6/2013    Dr. Dueñas     Family History   Problem Relation Age of Onset    Diabetes Mother     Cancer Father         lymphoma     Current Outpatient Medications   Medication Sig Dispense Refill    cetirizine (ZYRTEC) 10 MG tablet Take 10 mg by mouth once daily.      citalopram (CELEXA) 40 MG tablet Take 1 tablet (40 mg total) by mouth once daily. 90 tablet 3    MULTIVITAMIN ORAL Take 1 tablet by mouth once daily.       No current facility-administered medications for this visit.     Review of patient's allergies indicates:   Allergen Reactions    Ativan [lorazepam]        Review of Systems   Constitutional:  Negative for activity change, diaphoresis, fatigue and unexpected weight change.   HENT:  Negative for congestion, hearing loss, rhinorrhea, sinus pressure, sinus pain and trouble swallowing.    Eyes:  Negative for discharge and visual disturbance.   Respiratory:  Negative for cough, chest tightness, shortness of breath and wheezing.    Cardiovascular:  Negative for chest pain and palpitations.   Gastrointestinal:  Negative for blood in stool, constipation, diarrhea and vomiting.   Endocrine: Negative for polydipsia and polyuria.   Genitourinary:  Negative for difficulty urinating, dysuria, flank pain, hematuria, menstrual problem and urgency.   Musculoskeletal:  Negative for arthralgias, joint swelling and neck pain.  "  Neurological:  Negative for dizziness, weakness and headaches.   Psychiatric/Behavioral:  Negative for confusion, dysphoric mood and suicidal ideas. The patient is not nervous/anxious and does not have insomnia.         Blood pressure 118/72, pulse 78, resp. rate 18, height 5' 4" (1.626 m), weight 59.4 kg (130 lb 14.4 oz), SpO2 99 %. Body mass index is 22.47 kg/m².   Objective:      Physical Exam  Vitals reviewed.   Constitutional:       General: She is not in acute distress.     Appearance: Normal appearance. She is not ill-appearing or toxic-appearing.   HENT:      Head: Normocephalic and atraumatic.      Right Ear: Tympanic membrane and external ear normal. There is no impacted cerumen.      Left Ear: There is impacted cerumen.      Nose: Nose normal. No congestion or rhinorrhea.      Mouth/Throat:      Mouth: Mucous membranes are moist.      Pharynx: Oropharynx is clear. No oropharyngeal exudate or posterior oropharyngeal erythema.   Eyes:      Pupils: Pupils are equal, round, and reactive to light.   Cardiovascular:      Rate and Rhythm: Normal rate and regular rhythm.      Heart sounds: Normal heart sounds. No murmur heard.  Pulmonary:      Effort: Pulmonary effort is normal. No respiratory distress.      Breath sounds: Normal breath sounds. No wheezing or rhonchi.   Abdominal:      General: Abdomen is flat. There is no distension.      Palpations: Abdomen is soft.      Tenderness: There is no abdominal tenderness. There is no guarding.      Hernia: No hernia is present.   Skin:     General: Skin is warm and dry.      Capillary Refill: Capillary refill takes less than 2 seconds.   Neurological:      Mental Status: She is alert and oriented to person, place, and time.           Assessment:       1. Well adult exam    2. Encounter for screening mammogram for malignant neoplasm of breast    3. Anxiety    4. Encounter for lipid screening for cardiovascular disease    5. Diabetes mellitus screening         Plan: "           Well adult exam  Patient counseled on age appropriate medical preventive services, age appropriate cancer screenings, nutrition, healthy diet, consistent exercise regimen and maintaining an active lifestyle. All patient should ensure an adequate intake of dietary Calcium (1200mg/day) and Vitamin D (400-800 IU) daily.  Encounter for screening mammogram for malignant neoplasm of breast  -     Mammo Digital Screening Bilat w/ [facility]; Future; Expected date: 12/26/2023    Anxiety  -     citalopram (CELEXA) 40 MG tablet; Take 1 tablet (40 mg total) by mouth once daily.  Dispense: 90 tablet; Refill: 3    Encounter for lipid screening for cardiovascular disease  -     Lipid Panel; Future; Expected date: 12/28/2022    Diabetes mellitus screening  -     Comprehensive Metabolic Panel; Future; Expected date: 12/28/2022

## 2022-12-28 ENCOUNTER — OFFICE VISIT (OUTPATIENT)
Dept: FAMILY MEDICINE | Facility: CLINIC | Age: 42
End: 2022-12-28
Payer: COMMERCIAL

## 2022-12-28 VITALS
HEIGHT: 64 IN | OXYGEN SATURATION: 99 % | BODY MASS INDEX: 22.35 KG/M2 | SYSTOLIC BLOOD PRESSURE: 118 MMHG | WEIGHT: 130.88 LBS | DIASTOLIC BLOOD PRESSURE: 72 MMHG | HEART RATE: 78 BPM | RESPIRATION RATE: 18 BRPM

## 2022-12-28 DIAGNOSIS — F41.9 ANXIETY: ICD-10-CM

## 2022-12-28 DIAGNOSIS — Z00.00 WELL ADULT EXAM: Primary | ICD-10-CM

## 2022-12-28 DIAGNOSIS — Z13.1 DIABETES MELLITUS SCREENING: ICD-10-CM

## 2022-12-28 DIAGNOSIS — Z13.6 ENCOUNTER FOR LIPID SCREENING FOR CARDIOVASCULAR DISEASE: ICD-10-CM

## 2022-12-28 DIAGNOSIS — Z13.220 ENCOUNTER FOR LIPID SCREENING FOR CARDIOVASCULAR DISEASE: ICD-10-CM

## 2022-12-28 DIAGNOSIS — Z12.31 ENCOUNTER FOR SCREENING MAMMOGRAM FOR MALIGNANT NEOPLASM OF BREAST: ICD-10-CM

## 2022-12-28 PROCEDURE — 3008F BODY MASS INDEX DOCD: CPT | Mod: CPTII,S$GLB,, | Performed by: FAMILY MEDICINE

## 2022-12-28 PROCEDURE — 99396 PREV VISIT EST AGE 40-64: CPT | Mod: S$GLB,,, | Performed by: FAMILY MEDICINE

## 2022-12-28 PROCEDURE — 3074F PR MOST RECENT SYSTOLIC BLOOD PRESSURE < 130 MM HG: ICD-10-PCS | Mod: CPTII,S$GLB,, | Performed by: FAMILY MEDICINE

## 2022-12-28 PROCEDURE — 3078F PR MOST RECENT DIASTOLIC BLOOD PRESSURE < 80 MM HG: ICD-10-PCS | Mod: CPTII,S$GLB,, | Performed by: FAMILY MEDICINE

## 2022-12-28 PROCEDURE — 99396 PR PREVENTIVE VISIT,EST,40-64: ICD-10-PCS | Mod: S$GLB,,, | Performed by: FAMILY MEDICINE

## 2022-12-28 PROCEDURE — 1159F MED LIST DOCD IN RCRD: CPT | Mod: CPTII,S$GLB,, | Performed by: FAMILY MEDICINE

## 2022-12-28 PROCEDURE — 3074F SYST BP LT 130 MM HG: CPT | Mod: CPTII,S$GLB,, | Performed by: FAMILY MEDICINE

## 2022-12-28 PROCEDURE — 1159F PR MEDICATION LIST DOCUMENTED IN MEDICAL RECORD: ICD-10-PCS | Mod: CPTII,S$GLB,, | Performed by: FAMILY MEDICINE

## 2022-12-28 PROCEDURE — 3008F PR BODY MASS INDEX (BMI) DOCUMENTED: ICD-10-PCS | Mod: CPTII,S$GLB,, | Performed by: FAMILY MEDICINE

## 2022-12-28 PROCEDURE — 3078F DIAST BP <80 MM HG: CPT | Mod: CPTII,S$GLB,, | Performed by: FAMILY MEDICINE

## 2022-12-28 RX ORDER — CITALOPRAM 40 MG/1
40 TABLET, FILM COATED ORAL DAILY
Qty: 90 TABLET | Refills: 3 | Status: SHIPPED | OUTPATIENT
Start: 2022-12-28 | End: 2023-12-19

## 2023-01-17 LAB
ALBUMIN SERPL-MCNC: 4.2 G/DL (ref 3.6–5.1)
ALBUMIN/GLOB SERPL: 1.4 (CALC) (ref 1–2.5)
ALP SERPL-CCNC: 34 U/L (ref 31–125)
ALT SERPL-CCNC: 12 U/L (ref 6–29)
AST SERPL-CCNC: 16 U/L (ref 10–30)
BILIRUB SERPL-MCNC: 0.6 MG/DL (ref 0.2–1.2)
BUN SERPL-MCNC: 16 MG/DL (ref 7–25)
BUN/CREAT SERPL: NORMAL (CALC) (ref 6–22)
CALCIUM SERPL-MCNC: 8.9 MG/DL (ref 8.6–10.2)
CHLORIDE SERPL-SCNC: 106 MMOL/L (ref 98–110)
CHOLEST SERPL-MCNC: 216 MG/DL
CHOLEST/HDLC SERPL: 3.3 (CALC)
CO2 SERPL-SCNC: 24 MMOL/L (ref 20–32)
CREAT SERPL-MCNC: 0.65 MG/DL (ref 0.5–0.99)
EGFR: 114 ML/MIN/1.73M2
GLOBULIN SER CALC-MCNC: 2.9 G/DL (CALC) (ref 1.9–3.7)
GLUCOSE SERPL-MCNC: 85 MG/DL (ref 65–99)
HDLC SERPL-MCNC: 65 MG/DL
LDLC SERPL CALC-MCNC: 133 MG/DL (CALC)
NONHDLC SERPL-MCNC: 151 MG/DL (CALC)
POTASSIUM SERPL-SCNC: 3.8 MMOL/L (ref 3.5–5.3)
PROT SERPL-MCNC: 7.1 G/DL (ref 6.1–8.1)
SODIUM SERPL-SCNC: 139 MMOL/L (ref 135–146)
TRIGL SERPL-MCNC: 83 MG/DL

## 2023-09-22 ENCOUNTER — HOSPITAL ENCOUNTER (OUTPATIENT)
Dept: RADIOLOGY | Facility: HOSPITAL | Age: 43
Discharge: HOME | End: 2023-09-22
Attending: FAMILY MEDICINE
Payer: COMMERCIAL

## 2023-09-22 DIAGNOSIS — Z12.31 ENCOUNTER FOR SCREENING MAMMOGRAM FOR MALIGNANT NEOPLASM OF BREAST: ICD-10-CM

## 2023-09-22 PROCEDURE — 77067 SCR MAMMO BI INCL CAD: CPT | Mod: TC,PO

## 2023-11-08 NOTE — TELEPHONE ENCOUNTER
Occupational Therapy Screen     Patient Name: Tatiana Calzada  [id number] Date: 11/8/2023  Problem List  Active Problems:    Erectile dysfunction    Type 2 diabetes mellitus with hemoglobin A1c goal of less than 7.0% ([facility])    Dizziness and giddiness    Hypertension    Hyperlipidemia    Paresthesia and pain of both upper extremities    Vertebral artery stenosis    Past Medical History  Past Medical History:   Diagnosis Date    Dyslipidemia     Erectile dysfunction     Hypertension     Type 2 diabetes mellitus ([address])      Past Surgical History  Past Surgical History:   Procedure Laterality Date    ANKLE SURGERY Left     COLONOSCOPY             11/08/23 1144   Note Type   Note type Screen       OT orders received. Chart review completed. Patient admitted to [address] [place] [address] on 11/7 with Dx: Dizziness. Spoke with pt who reported no concerns regarding ADLs, IADLs or functional mobility upon return to home. Observed pt participating in functional mobility in room and hallway independently. Pt appears to be at prior level of functioning at this time and does not require acute OT services. D/C OT effective this date. If new concerns arise, please re-consult.       [employer], MS, OTR/L When the patient stopped by to pickup lab order, I was at the , and briefly discussed with her the CT scan and the cystoscopy and answered any questions she had.  She will get her BMP and CT done at [facility], and I have scheduled her cystoscopy on 3/27/17 at the [facility] and the [facility] as requested

## 2023-12-18 DIAGNOSIS — F41.9 ANXIETY: ICD-10-CM

## 2023-12-19 RX ORDER — CITALOPRAM 40 MG/1
40 TABLET, FILM COATED ORAL
Qty: 90 TABLET | Refills: 3 | Status: SHIPPED | OUTPATIENT
Start: 2023-12-19

## 2024-01-08 ENCOUNTER — OFFICE VISIT (OUTPATIENT)
Dept: FAMILY MEDICINE | Facility: CLINIC | Age: 44
End: 2024-01-08
Payer: COMMERCIAL

## 2024-01-08 VITALS
SYSTOLIC BLOOD PRESSURE: 115 MMHG | HEIGHT: 64 IN | HEART RATE: 79 BPM | DIASTOLIC BLOOD PRESSURE: 81 MMHG | WEIGHT: 136.81 LBS | BODY MASS INDEX: 23.36 KG/M2 | OXYGEN SATURATION: 98 %

## 2024-01-08 DIAGNOSIS — Z23 NEEDS FLU SHOT: ICD-10-CM

## 2024-01-08 DIAGNOSIS — Z12.31 ENCOUNTER FOR SCREENING MAMMOGRAM FOR MALIGNANT NEOPLASM OF BREAST: ICD-10-CM

## 2024-01-08 DIAGNOSIS — Z00.00 WELL ADULT EXAM: Primary | ICD-10-CM

## 2024-01-08 DIAGNOSIS — N32.81 OAB (OVERACTIVE BLADDER): ICD-10-CM

## 2024-01-08 PROCEDURE — 1159F MED LIST DOCD IN RCRD: CPT | Mod: CPTII,S$GLB,, | Performed by: FAMILY MEDICINE

## 2024-01-08 PROCEDURE — 3008F BODY MASS INDEX DOCD: CPT | Mod: CPTII,S$GLB,, | Performed by: FAMILY MEDICINE

## 2024-01-08 PROCEDURE — 90686 IIV4 VACC NO PRSV 0.5 ML IM: CPT | Mod: S$GLB,,, | Performed by: FAMILY MEDICINE

## 2024-01-08 PROCEDURE — 99396 PREV VISIT EST AGE 40-64: CPT | Mod: 25,S$GLB,, | Performed by: FAMILY MEDICINE

## 2024-01-08 PROCEDURE — 99999 PR PBB SHADOW E&M-EST. PATIENT-LVL III: CPT | Mod: PBBFAC,,, | Performed by: FAMILY MEDICINE

## 2024-01-08 PROCEDURE — 90471 IMMUNIZATION ADMIN: CPT | Mod: S$GLB,,, | Performed by: FAMILY MEDICINE

## 2024-01-08 PROCEDURE — 3079F DIAST BP 80-89 MM HG: CPT | Mod: CPTII,S$GLB,, | Performed by: FAMILY MEDICINE

## 2024-01-08 PROCEDURE — 3074F SYST BP LT 130 MM HG: CPT | Mod: CPTII,S$GLB,, | Performed by: FAMILY MEDICINE

## 2024-01-08 RX ORDER — OXYBUTYNIN CHLORIDE 5 MG/1
5 TABLET, EXTENDED RELEASE ORAL DAILY
Qty: 30 TABLET | Refills: 11 | Status: SHIPPED | OUTPATIENT
Start: 2024-01-08 | End: 2025-01-07

## 2024-01-08 NOTE — PROGRESS NOTES
SUBJECTIVE:    Patient ID: Portia Cruz is a 43 y.o. female.    Chief Complaint: Annual Exam  44 yo female here for an annual exam in for medication refills     , 2 children (boys girl) .       I reviewed patient's overdue health maintenance she is currently due for hepatitis-C screening, HIV screening, COVID-19 vaccine.    SPMHx  Anxiety: Celexa 40mg  Allergic Rhinitis: Flonase, Claritin 10mg        Specialists  Derm: Dr Andersen  GYN: Dr Darling        Smoke: None  ETOH: 1 a month  Exercise: None       Lipids  Cholesterol 216   Triglycerides 83   HDL 65       Glucose 65 - 99 mg/dL 85       HPI      Past Medical History:   Diagnosis Date    Dysthymic disorder     Postpartum depression      Social History     Socioeconomic History    Marital status:     Number of children: 2   Occupational History     Employer: [employer]   Tobacco Use    Smoking status: Never    Smokeless tobacco: Never   Substance and Sexual Activity    Alcohol use: Yes     Alcohol/week: 0.0 standard drinks of alcohol     Comment: once a month    Drug use: No    Sexual activity: Yes     Partners: Male     Birth control/protection: Pill     Comment: No hx of STDs   Social History Narrative    The patient does not exercise regularly ().    Rates diet as good.    She is satisfied with weight.                 Social Determinants of Health     Financial Resource Strain: Low Risk  (1/8/2024)    Overall Financial Resource Strain (CARDIA)     Difficulty of Paying Living Expenses: Not very hard   Food Insecurity: No Food Insecurity (1/8/2024)    Hunger Vital Sign     Worried About Running Out of Food in the Last Year: Never true     Ran Out of Food in the Last Year: Never true   Transportation Needs: No Transportation Needs (1/8/2024)    PRAPARE - Transportation     Lack of Transportation (Medical): No     Lack of Transportation (Non-Medical): No   Physical Activity: Insufficiently Active (1/8/2024)    Exercise Vital  Sign     Days of Exercise per Week: 2 days     Minutes of Exercise per Session: 20 min   Stress: No Stress Concern Present (1/8/2024)    [place] [employer] - Occupational Stress Questionnaire     Feeling of Stress : Not at all   Social Connections: Unknown (1/8/2024)    Social Connection and Isolation Panel [NHANES]     Frequency of Communication with Friends and Family: More than three times a week     Frequency of Social Gatherings with Friends and Family: Once a week     Active Member of Clubs or Organizations: No     Marital Status:    Housing Stability: Low Risk  (1/8/2024)    Housing Stability Vital Sign     Unable to Pay for Housing in the Last Year: No     Number of Places Lived in the Last Year: 1     Unstable Housing in the Last Year: No     Past Surgical History:   Procedure Laterality Date    PATELLA REALIGNMENT  6/2013    Dr. Dueñas     Family History   Problem Relation Age of Onset    Diabetes Mother     Cancer Father         lymphoma     Current Outpatient Medications   Medication Sig Dispense Refill    cetirizine (ZYRTEC) 10 MG tablet Take 10 mg by mouth once daily.      citalopram (CELEXA) 40 MG tablet TAKE 1 TABLET BY MOUTH EVERY DAY 90 tablet 3    MULTIVITAMIN ORAL Take 1 tablet by mouth once daily.      oxybutynin (DITROPAN-XL) 5 MG TR24 Take 1 tablet (5 mg total) by mouth once daily. 30 tablet 11     No current facility-administered medications for this visit.     Review of patient's allergies indicates:   Allergen Reactions    Ativan [lorazepam]        Review of Systems   Constitutional:  Negative for activity change, diaphoresis, fatigue and unexpected weight change.   HENT:  Negative for congestion, hearing loss, rhinorrhea, sinus pressure, sinus pain and trouble swallowing.    Eyes:  Negative for discharge and visual disturbance.   Respiratory:  Negative for cough, chest tightness, shortness of breath and wheezing.    Cardiovascular:  Negative for chest pain and  "palpitations.   Gastrointestinal:  Negative for blood in stool, constipation, diarrhea and vomiting.   Endocrine: Negative for polydipsia and polyuria.   Genitourinary:  Positive for urgency. Negative for difficulty urinating, dysuria, flank pain, hematuria and menstrual problem.   Musculoskeletal:  Negative for arthralgias, joint swelling and neck pain.   Neurological:  Negative for dizziness, weakness and headaches.   Psychiatric/Behavioral:  Negative for confusion, dysphoric mood and suicidal ideas. The patient is not nervous/anxious.           Blood pressure 115/81, pulse 79, height 5' 4" (1.626 m), weight 62.1 kg (136 lb 12.8 oz), last menstrual period 12/21/2023, SpO2 98 %. Body mass index is 23.48 kg/m².   Objective:      Physical Exam  Vitals reviewed.   Constitutional:       General: She is not in acute distress.     Appearance: Normal appearance. She is not ill-appearing or toxic-appearing.   HENT:      Head: Normocephalic and atraumatic.      Right Ear: Tympanic membrane and external ear normal. There is no impacted cerumen.      Left Ear: There is impacted cerumen.      Nose: Nose normal. No congestion or rhinorrhea.      Mouth/Throat:      Mouth: Mucous membranes are moist.      Pharynx: Oropharynx is clear. No oropharyngeal exudate or posterior oropharyngeal erythema.   Eyes:      Pupils: Pupils are equal, round, and reactive to light.   Cardiovascular:      Rate and Rhythm: Normal rate and regular rhythm.      Heart sounds: Normal heart sounds. No murmur heard.  Pulmonary:      Effort: Pulmonary effort is normal. No respiratory distress.      Breath sounds: Normal breath sounds. No wheezing or rhonchi.   Abdominal:      General: Abdomen is flat. There is no distension.      Palpations: Abdomen is soft.      Tenderness: There is no abdominal tenderness. There is no guarding.      Hernia: No hernia is present.   Skin:     General: Skin is warm and dry.      Capillary Refill: Capillary refill takes less " than 2 seconds.   Neurological:      Mental Status: She is alert and oriented to person, place, and time.             Assessment:       1. Well adult exam    2. Encounter for screening mammogram for malignant neoplasm of breast    3. OAB (overactive bladder)    4. Needs flu shot         Plan:           Well adult exam  -     Cancel: LIPID PANEL; Future; Expected date: 01/08/2024  -     Cancel: COMPREHENSIVE METABOLIC PANEL; Future; Expected date: 01/08/2024  -     COMPREHENSIVE METABOLIC PANEL; Future; Expected date: 01/08/2024  -     LIPID PANEL; Future; Expected date: 01/08/2024  Patient counseled on age appropriate medical preventive services, age appropriate cancer screenings, nutrition, healthy diet, consistent exercise regimen and maintaining an active lifestyle.     Encounter for screening mammogram for malignant neoplasm of breast  -     Mammo Digital Screening Bilat w/ [facility]; Future; Expected date: 09/22/2024    OAB (overactive bladder)  -     oxybutynin (DITROPAN-XL) 5 MG TR24; Take 1 tablet (5 mg total) by mouth once daily.  Dispense: 30 tablet; Refill: 11    Needs flu shot  -     Influenza - Quadrivalent *Preferred* (6 months+) (PF)

## 2024-01-21 LAB
ALBUMIN SERPL-MCNC: 4.3 G/DL (ref 3.6–5.1)
ALBUMIN/GLOB SERPL: 1.5 (CALC) (ref 1–2.5)
ALP SERPL-CCNC: 34 U/L (ref 31–125)
ALT SERPL-CCNC: 14 U/L (ref 6–29)
AST SERPL-CCNC: 17 U/L (ref 10–30)
BILIRUB SERPL-MCNC: 0.4 MG/DL (ref 0.2–1.2)
BUN SERPL-MCNC: 18 MG/DL (ref 7–25)
BUN/CREAT SERPL: NORMAL (CALC) (ref 6–22)
CALCIUM SERPL-MCNC: 8.9 MG/DL (ref 8.6–10.2)
CHLORIDE SERPL-SCNC: 105 MMOL/L (ref 98–110)
CHOLEST SERPL-MCNC: 207 MG/DL
CHOLEST/HDLC SERPL: 3.4 (CALC)
CO2 SERPL-SCNC: 29 MMOL/L (ref 20–32)
CREAT SERPL-MCNC: 0.56 MG/DL (ref 0.5–0.99)
EGFR: 118 ML/MIN/1.73M2
GLOBULIN SER CALC-MCNC: 2.8 G/DL (CALC) (ref 1.9–3.7)
GLUCOSE SERPL-MCNC: 85 MG/DL (ref 65–99)
HDLC SERPL-MCNC: 61 MG/DL
LDLC SERPL CALC-MCNC: 131 MG/DL (CALC)
NONHDLC SERPL-MCNC: 146 MG/DL (CALC)
POTASSIUM SERPL-SCNC: 4.3 MMOL/L (ref 3.5–5.3)
PROT SERPL-MCNC: 7.1 G/DL (ref 6.1–8.1)
SODIUM SERPL-SCNC: 140 MMOL/L (ref 135–146)
TRIGL SERPL-MCNC: 63 MG/DL

## 2024-03-18 NOTE — PROGRESS NOTES
SUBJECTIVE:    Patient ID: Portia Cruz is a 43 y.o. female.    Chief Complaint: Arm Pain (Left)  42 yo female here complaining or left upper arm pain, pt denies any hx of trauma or over use. It started > 1 week ago, painful with motion and when she wakes up.     , 2 children (boys girl) .        I reviewed patient's overdue health maintenance she is currently due for hepatitis-C screening, HIV screening, COVID-19 vaccine.     SPMHx  Anxiety: Celexa 40mg  Allergic Rhinitis: Flonase, Claritin 10mg        Specialists  Derm: Dr Andersen  GYN: Dr Darling        Smoke: None  ETOH: 1 a month  Exercise: None    Arm Pain   The incident occurred more than 1 week ago. The incident occurred at home. There was no injury mechanism. The pain is present in the left shoulder and upper left arm. The quality of the pain is described as aching. The pain is mild. The pain has been Fluctuating since the incident. Pertinent negatives include no chest pain.         Past Medical History:   Diagnosis Date    Dysthymic disorder     Postpartum depression      Social History     Socioeconomic History    Marital status:     Number of children: 2   Occupational History     Employer: [employer]   Tobacco Use    Smoking status: Never    Smokeless tobacco: Never   Substance and Sexual Activity    Alcohol use: Yes     Alcohol/week: 0.0 standard drinks of alcohol     Comment: once a month    Drug use: No    Sexual activity: Yes     Partners: Male     Birth control/protection: Pill     Comment: No hx of STDs   Social History Narrative    The patient does not exercise regularly ().    Rates diet as good.    She is satisfied with weight.                 Social Determinants of Health     Financial Resource Strain: Low Risk  (1/8/2024)    Overall Financial Resource Strain (CARDIA)     Difficulty of Paying Living Expenses: Not very hard   Food Insecurity: No Food Insecurity (1/8/2024)    Hunger Vital Sign     Worried About  Running Out of Food in the Last Year: Never true     Ran Out of Food in the Last Year: Never true   Transportation Needs: No Transportation Needs (1/8/2024)    PRAPARE - Transportation     Lack of Transportation (Medical): No     Lack of Transportation (Non-Medical): No   Physical Activity: Insufficiently Active (1/8/2024)    Exercise Vital Sign     Days of Exercise per Week: 2 days     Minutes of Exercise per Session: 20 min   Stress: No Stress Concern Present (1/8/2024)    [place] [employer] - Occupational Stress Questionnaire     Feeling of Stress : Not at all   Social Connections: Unknown (1/8/2024)    Social Connection and Isolation Panel [NHANES]     Frequency of Communication with Friends and Family: More than three times a week     Frequency of Social Gatherings with Friends and Family: Once a week     Active Member of Clubs or Organizations: No     Marital Status:    Housing Stability: Low Risk  (1/8/2024)    Housing Stability Vital Sign     Unable to Pay for Housing in the Last Year: No     Number of Places Lived in the Last Year: 1     Unstable Housing in the Last Year: No     Past Surgical History:   Procedure Laterality Date    PATELLA REALIGNMENT  6/2013    Dr. Dueñas     Family History   Problem Relation Age of Onset    Diabetes Mother     Cancer Father         lymphoma     Current Outpatient Medications   Medication Sig Dispense Refill    cetirizine (ZYRTEC) 10 MG tablet Take 10 mg by mouth once daily.      citalopram (CELEXA) 40 MG tablet TAKE 1 TABLET BY MOUTH EVERY DAY 90 tablet 3    MULTIVITAMIN ORAL Take 1 tablet by mouth once daily.      oxybutynin (DITROPAN-XL) 5 MG TR24 Take 1 tablet (5 mg total) by mouth once daily. 30 tablet 11     No current facility-administered medications for this visit.     Review of patient's allergies indicates:   Allergen Reactions    Ativan [lorazepam]        Review of Systems   Constitutional:  Negative for activity change, appetite  "change, diaphoresis, fatigue, fever and unexpected weight change.   Respiratory:  Negative for cough and shortness of breath.    Cardiovascular:  Negative for chest pain and palpitations.   Musculoskeletal:  Positive for arthralgias (left upper arm pain).          Blood pressure 111/72, pulse 107, height 5' 4" (1.626 m), weight 59.5 kg (131 lb 1.6 oz), last menstrual period 03/05/2024, SpO2 97 %. Body mass index is 22.5 kg/m².   Objective:      Physical Exam  Vitals reviewed.   Constitutional:       General: She is not in acute distress.     Appearance: Normal appearance. She is normal weight. She is not ill-appearing or toxic-appearing.   Musculoskeletal:      Left upper arm: Normal. No swelling, edema, deformity, lacerations, tenderness or bony tenderness.        Arms:       Comments: Normal ROM, no evidence of impingement or rotator cuff pathology.    Neurological:      Mental Status: She is alert.             Assessment:       1. Left upper arm pain         Plan:           Left upper arm pain  -     Ambulatory referral/consult to Physical/Occupational Therapy; Future; Expected date: 03/26/2024    Will treat conservatively, and have her follow up with PT if not resolving will refer to ortho and imaging                      "

## 2024-03-19 ENCOUNTER — OFFICE VISIT (OUTPATIENT)
Dept: FAMILY MEDICINE | Facility: CLINIC | Age: 44
End: 2024-03-19
Payer: COMMERCIAL

## 2024-03-19 VITALS
WEIGHT: 131.13 LBS | HEART RATE: 107 BPM | DIASTOLIC BLOOD PRESSURE: 72 MMHG | HEIGHT: 64 IN | OXYGEN SATURATION: 97 % | SYSTOLIC BLOOD PRESSURE: 111 MMHG | BODY MASS INDEX: 22.39 KG/M2

## 2024-03-19 DIAGNOSIS — M79.622 LEFT UPPER ARM PAIN: Primary | ICD-10-CM

## 2024-03-19 PROCEDURE — 99999 PR PBB SHADOW E&M-EST. PATIENT-LVL IV: CPT | Mod: PBBFAC,,, | Performed by: FAMILY MEDICINE

## 2024-03-19 PROCEDURE — 3074F SYST BP LT 130 MM HG: CPT | Mod: CPTII,S$GLB,, | Performed by: FAMILY MEDICINE

## 2024-03-19 PROCEDURE — 99213 OFFICE O/P EST LOW 20 MIN: CPT | Mod: S$GLB,,, | Performed by: FAMILY MEDICINE

## 2024-03-19 PROCEDURE — 3008F BODY MASS INDEX DOCD: CPT | Mod: CPTII,S$GLB,, | Performed by: FAMILY MEDICINE

## 2024-03-19 PROCEDURE — 1159F MED LIST DOCD IN RCRD: CPT | Mod: CPTII,S$GLB,, | Performed by: FAMILY MEDICINE

## 2024-03-19 PROCEDURE — 3078F DIAST BP <80 MM HG: CPT | Mod: CPTII,S$GLB,, | Performed by: FAMILY MEDICINE

## 2024-04-04 ENCOUNTER — CLINICAL SUPPORT (OUTPATIENT)
Dept: REHABILITATION | Facility: HOSPITAL | Age: 44
End: 2024-04-04
Attending: FAMILY MEDICINE
Payer: COMMERCIAL

## 2024-04-04 DIAGNOSIS — M79.622 LEFT UPPER ARM PAIN: ICD-10-CM

## 2024-04-04 DIAGNOSIS — M25.611 SHOULDER JOINT STIFFNESS, BILATERAL: ICD-10-CM

## 2024-04-04 DIAGNOSIS — M25.69 BACK STIFFNESS: ICD-10-CM

## 2024-04-04 DIAGNOSIS — M25.522 UPPER ARM JOINT PAIN, LEFT: ICD-10-CM

## 2024-04-04 DIAGNOSIS — M25.612 SHOULDER JOINT STIFFNESS, BILATERAL: ICD-10-CM

## 2024-04-04 DIAGNOSIS — M25.512 ACUTE PAIN OF LEFT SHOULDER: Primary | ICD-10-CM

## 2024-04-04 PROCEDURE — 97530 THERAPEUTIC ACTIVITIES: CPT | Mod: PN

## 2024-04-04 PROCEDURE — 97162 PT EVAL MOD COMPLEX 30 MIN: CPT | Mod: PN

## 2024-04-04 PROCEDURE — 97110 THERAPEUTIC EXERCISES: CPT | Mod: PN

## 2024-04-04 PROCEDURE — 97140 MANUAL THERAPY 1/> REGIONS: CPT | Mod: PN

## 2024-04-04 NOTE — PLAN OF CARE
[facility]   Physical Therapy Initial Evaluation     Date: 4/4/2024   Name: Portia Cruz  Clinic Number: [id number]    Therapy Diagnosis:   Encounter Diagnoses   Name Primary?    Left upper arm pain     Acute pain of left shoulder Yes    Upper arm joint pain, left     Shoulder joint stiffness, bilateral     Back stiffness      Physician: Michael Alfaro MD    Physician Orders: PT Eval and Treat   Medical Diagnosis from Referral:  Left upper arm pain [M79.622]   Evaluation Date: 4/4/2024  Authorization Period Expiration: 3/19/2025  Plan of Care Expiration: 6/28/2024  Progress Note Due: 5/3/2024  Visit # / Visits authorized: 1 / 1   FOTO: Issued Visit #: 1/3, (capture on visit 1, 5, 10) first on 4/4/2024    Precautions: Standard     Time In: 9:45  Time Out: 10:45  Total Appointment Time (timed & untimed codes): 60 minutes      SUBJECTIVE   Date of onset: several months but worse the past few weeks    History of current condition -  42 yo female here complaining or left upper arm pain, pt denies any hx of trauma or over use. It started > 1 week ago, painful with motion and when she wakes up.   The incident occurred more than 1 week ago. The incident occurred at home. There was no injury mechanism. The pain is present in the left shoulder and upper left arm. The quality of the pain is described as aching. The pain is mild. The pain has been Fluctuating since the incident. Pertinent negatives include no chest pain.      Today Portia reports: started out as random pain in back of upper Left arm.  Started raising arms overhead with [place], would hurt with standing with arms crossed but better with arms dangling by her side.  Sleeping on hard surfaces bothers the Left Upper Extremity more.  She mostly describes an intermittent ache in posterior Left upper arm.  No associated weakness in arm, increased tightness usually in her Upper Trapezius.  No sharp or stabbing pain.    Falls:  none    Imaging, none:     Prior Therapy: yes, Left knee  Social History: Portia reports she lives with family in 1-story   Occupation:  (pre-K, special needs)  Prior Level of Function: no limitations  Current Level of Function: avoiding lifting and carrying, reaching behind back, reaching overhead    Pain:  Current 1/10, worst 3/10, best 0/10   Location: Bilateral Upper Trapezius, Left posterior upper arm  Description: Aching  Aggravating Factors: lifting, carrying, reaching overhead and behind back  Easing Factors: rest, NDSAIDs, stop aggravating movements/positions    Patients goals: to not hurt     Medical History:   Past Medical History:   Diagnosis Date    Dysthymic disorder     Postpartum depression      Surgical History:   Portia Cruz  has a past surgical history that includes Patella realignment (6/2013).    Medications:   Portia has a current medication list which includes the following prescription(s): cetirizine, citalopram, multivitamin, and oxybutynin.    Allergies:   Review of patient's allergies indicates:   Allergen Reactions    Ativan [lorazepam]           OBJECTIVE     Posture/alignment:  forward head posture, rounded shoulders.    Cervical ROM:  WFL-WNL    Sensation:  intact to light touch Bilateral Upper Extremities    Cervical Radiculopathy    Left  Right   Spurling's Test (A-sidebend) negative negative     Distraction Test negative   negative     MNTT positive   negative     UNTT negative   negative     RNTT negative   negative       Shoulder AROM (PROM):   Left  Right    Flexion  140 (152)  150 (163)   Abduction  155 (158)  165 (173)   External Rotation       (77)       (88)   Internal Rotation       (WNL)       (WNL)     Shoulder Strength:   Left  Right    Flexion  5/5 5/5   Scaption 5/5 5/5   Abduction 5/5 5/5   External Rotation 5/5 5/5   Internal Rotation 5/5 5/5      Palpation:  Increased tone and tenderness to palpation noted Left>Right subscapularis, teres,  infraspinatus, lats.  Presence of trigger points noted Left LH biceps and brachialis.    Glenohumeral joint:  good mobility    Scap-thoracic:  tight and guarded, tight Left>Right periscapular muscles.    Special Test Clusters:  + mild c/o impingement with H-K and Neer     Other Considerations:  Left Upper Extremity c/o TOS type symptoms with palpation and stretching of Left pecs with Left shoulder abducted  Left + MNTT more pronounced at wrist and lesser degree at elbow    Limitation/Restriction for FOTO DASH Survey    Therapist reviewed FOTO scores for Portia Cruz on 4/4/2024.   FOTO documents entered into [employer] - see Media section.    Intake Score: 58%.  Predicted 71%         TREATMENT     Total Treatment time (time-based codes) separate from Evaluation: 30 minutes      Portia received the treatments listed below:      manual therapy techniques: for 10 minutes, including:  Supine myofascial release, trigger point release to Left pec minor, coracobrachialis, SH biceps, LH biceps, brachialis.  Progressed to release at subscapularis, teres, latissimus dorsi, infraspinatus    therapeutic exercises for 10 minutes including:  Supine thoracic extension stretch with chin tuck on towel roll b/w scaps  Supine thoracic extension stretch with chin tuck on towel roll b/w scaps + pec stretch with arms abducted (range as tolerated)  upine thoracic extension stretch with chin tuck on towel roll b/w scaps + snow severino pec stretch (range as tolerated)    neuromuscular re-education activities for 00 minutes. The following activities were included:  Supine Internal Rotation/External Rotation against manual resistance at 0*      therapeutic activities for 10  minutes, including:  Eval findings with education and demonstration regarding posture, postural awareness, joint and soft tissue deficits, body mechanics, arthrokinematics, diagnosis, functional limitations related to deficits and diagnosis.       Plan for Next  Visit:  Assess presentation to clinic and patients response to the last visit following IE, manual therapy intervention, therapeutic exercises, pt education, and HEP.  Manual therapy to address alignment, mobility, flexibility, tenderness, soft tissue restrictions and/or presence of trigger points.  Add stretching, stabilization, mobilization and strengthening exercises as appropriate.  Modalities, Dry Needling if indicated.        PATIENT EDUCATION AND HOME EXERCISES     Education provided:   PT provided education and demonstration of HEP to include:   Eval findings with education and demonstration regarding posture, postural awareness, joint and soft tissue deficits, body mechanics, arthrokinematics, diagnosis, functional limitations related to deficits and diagnosis.      Self massage to tight and tender muscles prior to stretching.  Stretch pain free range of motion as tolerated.    Supine thoracic extension stretch with chin tuck on towel roll b/w scaps  Supine thoracic extension stretch with chin tuck on towel roll b/w scaps + pec stretch with arms abducted (range as tolerated)  upine thoracic extension stretch with chin tuck on towel roll b/w scaps + snow severino pec stretch (range as tolerated)      Pt was instructed to perform these daily.  Pt was given instructions to stop use of HEP if there are any adverse reactions/responses and f/u with PT next treatment to discuss.    Home Exercises Provided:  Exercises were reviewed and Portia was able to demonstrate them prior to the end of the session.  Portia demonstrated fair  understanding of the education provided.   See EMR under Patient Instructions for exercises provided during therapy sessions.    ASSESSMENT     Portia is a 43 y.o. female referred to outpatient Physical Therapy with a medical diagnosis of  Left upper arm pain [M79.622] .   Patient presents with increased c/o Left shoulder and upper arm pain that has been intermittent for several months  but more pronounced for the past several weeks.  She presents with slouching and rounded shoulder postural habits limiting Bilateral shoulder mobility/flexibility, decreased scap-thoracic and periscapular muscle strength, soft tissue restrictions, thoracic restrictions, presence of Left shoulder impingement, altered scap-thoracic and scap-humeral mobility.  She would benefit from skilled PT to address deficits to help improve functional mobility and balance to her head/neck, scap-thoracic and shoulder regions.    Patient prognosis is Good.   Patient will benefit from skilled outpatient Physical Therapy to address the deficits stated above and in the chart below, provide patient /family education, and to maximize patientt's level of independence.     Plan of care discussed with patient: Yes  Patient's spiritual, cultural and educational needs considered and patient is agreeable to the plan of care and goals as stated below:     Anticipated Barriers for therapy: Schedule conflicts, transportation, pain, guarding, tolerance, endurance, posture, postural awareness, joint and soft tissue deficits    Medical Necessity is demonstrated by the following  History  Co-morbidities and personal factors that may impact the plan of care [] LOW: no personal factors / co-morbidities  [x] MODERATE: 1-2 personal factors / co-morbidities  [] HIGH: 3+ personal factors / co-morbidities    Moderate / High Support Documentation:   Co-morbidities affecting plan of care: See PMHx    Personal Factors:   lifestyle     Examination  Body Structures and Functions, activity limitations and participation restrictions that may impact the plan of care [] LOW: addressing 1-2 elements  [] MODERATE: 3+ elements  [] HIGH: 4+ elements (please support below)    Moderate / High Support Documentation: pain, ROM, weakness, postural awareness, joint and soft tissue deficits     Clinical Presentation [] LOW: stable  [x] MODERATE: Evolving  [] HIGH: Unstable      Decision Making/ Complexity Score: moderate       Goals:  Short-Term: 4 weeks (5/3/2024)  Pt will improve bilateralActive shouder flexion to > or = 155 to promote return to prior functional status.  Pt will improve bilateralActive shouder abduction to > or = 160 to promote return to prior functional status.  Pt will improve leftPassive shouder external rotation to > or = 90 to promote return to prior functional status.  Pt will improve flexibility/mobility of her shoulder, upper arm, pecs, periscapular muscles to help promoted better mobility, posture, alignment, and help return to prior functional status.  Pt will demonstrate improved postural habits as evidenced by moderately improved upright posture with reduced forward head posture.  Pt will decrease pain levels < or = to 3/10 to help promote appropriate progression of PT, HEP, and ADL's    Pt will be compliant with new home exercise program to assist with PT intervention and help allow appropriate progression through plan of care.    Long-Term: 12 weeks (6/28/2024)  Pt will improve bilateral shoulder flexion/abduction AROM > or = 165 to allow return to normal activities of daily living.  Pt will improve bilateral shoulder external rotation PROM > or = 95 to allow return to normal activities of daily living.  Pt will improve bilateral shoulder strength to > or = 5/5 to allow performance of activities of daily living.  Pt will improve trunk and scap-thoracic strength/stabilization to > or = to GOOD to allow better posture, alignment, and help return to prior functional status.  Pt will have > or = to GOOD flexibility/mobility of her shoulder, upper arm, pecs, periscapular muscles to help promoted better mobility, posture, alignment, and help return to prior functional status.  Pt will report < or = 1-2/10 pain during activities of daily living to improve QOL and allow return to prior functional status.   Pt will be compliant and independent with HEP to allow  and maintain better participation in normal activities  Pt will be able to resume normals ADL's, IADL's, previous activities, fitness, and work related tasks     PLAN   Plan of care Certification: 4/4/2024 to 6/28/2024.      Outpatient Physical Therapy 1-2 times weekly for 12 weeks to include the following interventions: Electrical Stimulation attended/unattended, Gait Training, Manual Therapy, Moist Heat/ Ice, Neuromuscular Re-ed, Orthotic Management and Training, Patient Education, Therapeutic Activities, Therapeutic Exercise, dry needling and Ultrasound.       Matthias Escalera, PT      I CERTIFY THE NEED FOR THESE SERVICES FURNISHED UNDER THIS PLAN OF TREATMENT AND WHILE UNDER MY CARE   Physician's comments:     Physician's Signature: ___________________________________________________

## 2024-04-04 NOTE — PATIENT INSTRUCTIONS
Education provided:   PT provided education and demonstration of HEP to include:   Eval findings with education and demonstration regarding posture, postural awareness, joint and soft tissue deficits, body mechanics, arthrokinematics, diagnosis, functional limitations related to deficits and diagnosis.      Self massage to tight and tender muscles prior to stretching.  Stretch pain free range of motion as tolerated.    Supine thoracic extension stretch with chin tuck on towel roll b/w scaps  Supine thoracic extension stretch with chin tuck on towel roll b/w scaps + pec stretch with arms abducted (range as tolerated)  upine thoracic extension stretch with chin tuck on towel roll b/w scaps + snow severino pec stretch (range as tolerated)      Pt was instructed to perform these daily.  Pt was given instructions to stop use of HEP if there are any adverse reactions/responses and f/u with PT next treatment to discuss.    Home Exercises Provided:  Exercises were reviewed and Portia was able to demonstrate them prior to the end of the session.  Portia demonstrated fair  understanding of the education provided.   See EMR under Patient Instructions for exercises provided during therapy sessions.

## 2024-04-15 NOTE — PROGRESS NOTES
"[facility]   Physical Therapy Treatment Note      Name: Portia Cruz  Clinic Number: [id number]    Therapy Diagnosis:   Encounter Diagnoses   Name Primary?    Acute pain of left shoulder Yes    Upper arm joint pain, left     Shoulder joint stiffness, bilateral     Back stiffness      Physician: Michael Alfaro MD    Visit Date: 4/16/2024    Physician Orders: PT Eval and Treat   Medical Diagnosis from Referral:  Left upper arm pain [M79.622]   Evaluation Date: 4/4/2024  Authorization Period Expiration: 3/19/2025  Plan of Care Expiration: 6/28/2024  Progress Note Due: 5/3/2024  Visit # / Visits authorized: 1 / 20 +eval   FOTO: Issued Visit #: 1/3, (capture on visit 1, 5, 10) first on 4/4/2024     PTA Visit #: 1/5     Precautions: Standard      Time In: 6:50  Time Out: 7:35  Total Appointment Time (timed & untimed codes): 45 minutes      Subjective     Patient reports: continued occasional symptoms into the L shoulder, especially with reaching up, out or behind the back.  She was compliant with home exercise program.  Response to previous treatment: felt okay  Current Level of Function: avoiding lifting and carrying, reaching behind back, reaching overhead  Prior Level of Function: no limitations    Pain: 0/10 at rest, 6/10 at worst  Location:  L shoulder    Objective      Objective Measures updated at progress report unless specified.     Treatment     Portia received the treatments listed below:      therapeutic exercises to develop strength, endurance, ROM, flexibility, and posture for 30 minutes including:  Supine shoulder horizontal abduction with towel roll b/w scaps x 10  Supine thoracic extension stretch with chin tuck on towel roll b/w scaps 2 x 10  Supine thoracic extension stretch with chin tuck on towel roll b/w scaps + pec stretch with arms abducted (range as tolerated) 3x30"  Supine thoracic extension stretch with chin tuck on towel roll b/w scaps + snow severino pec " "stretch (range as tolerated) x 10  Seated L scalene and UT stretch 3x20"  Seated cervical retraction 15 x 5"  Seated bilateral ER 2 x 10  Standing median nn glide x 15    manual therapy techniques: Joint mobilizations, Myofacial release, and Soft tissue Mobilization were applied to the: L shoulder for 13 minutes, including:  Posterior and inferior GH mobs grade II-III  Supine myofascial release, trigger point release to Left pec minor, coracobrachialis, SH biceps, LH biceps, brachialis.  Progressed to release at subscapularis, teres, latissimus dorsi, infraspinatus    neuromuscular re-education activities to improve: Coordination, Kinesthetic, Proprioception, Posture, and Motor Control for 2 minutes. The following activities were included:  Supine Internal Rotation/External Rotation against manual resistance at 0*    therapeutic activities to improve functional performance for 00  minutes, including:  May add at later date      Patient Education and Home Exercises       Education provided:   - importance of avoiding onset of pain, tingling, or numbness into the left arm  - activity modification as needed for pain  - importance of compliance to HEP    Written Home Exercises Provided: yes. Exercises were reviewed and Portia was able to demonstrate them prior to the end of the session.  Portia demonstrated good  understanding of the education provided. See Electronic Medical Record under Patient Instructions for exercises provided during therapy sessions    Assessment     Patient presents to clinic with continued pain into the L shoulder with discomfort of reaching up, out, and behind back. Some stiffness of the L GH joint which did improve with manual techniques. Noted tightness along the lateral neck and anterior shoulder which lessened with seated stretches and gentle nerve glides, possible mild signs of TOS present. Patient will benefit from further progression of mobility and strength.    Portia Is " progressing well towards her goals.   Patient prognosis is Good.     Patient will continue to benefit from skilled outpatient physical therapy to address the deficits listed in the problem list box on initial evaluation, provide pt/family education and to maximize pt's level of independence in the home and community environment.     Patient's spiritual, cultural and educational needs considered and pt agreeable to plan of care and goals.     Anticipated Barriers for therapy: Schedule conflicts, transportation, pain, guarding, tolerance, endurance, posture, postural awareness, joint and soft tissue deficits    Goals:   Short-Term: 4 weeks (5/3/2024)  Pt will improve bilateralActive shouder flexion to > or = 155 to promote return to prior functional status.  Pt will improve bilateralActive shouder abduction to > or = 160 to promote return to prior functional status.  Pt will improve leftPassive shouder external rotation to > or = 90 to promote return to prior functional status.  Pt will improve flexibility/mobility of her shoulder, upper arm, pecs, periscapular muscles to help promoted better mobility, posture, alignment, and help return to prior functional status.  Pt will demonstrate improved postural habits as evidenced by moderately improved upright posture with reduced forward head posture.  Pt will decrease pain levels < or = to 3/10 to help promote appropriate progression of PT, HEP, and ADL's    Pt will be compliant with new home exercise program to assist with PT intervention and help allow appropriate progression through plan of care.     Long-Term: 12 weeks (6/28/2024)  Pt will improve bilateral shoulder flexion/abduction AROM > or = 165 to allow return to normal activities of daily living.  Pt will improve bilateral shoulder external rotation PROM > or = 95 to allow return to normal activities of daily living.  Pt will improve bilateral shoulder strength to > or = 5/5 to allow performance of activities of  daily living.  Pt will improve trunk and scap-thoracic strength/stabilization to > or = to GOOD to allow better posture, alignment, and help return to prior functional status.  Pt will have > or = to GOOD flexibility/mobility of her shoulder, upper arm, pecs, periscapular muscles to help promoted better mobility, posture, alignment, and help return to prior functional status.  Pt will report < or = 1-2/10 pain during activities of daily living to improve QOL and allow return to prior functional status.   Pt will be compliant and independent with HEP to allow and maintain better participation in normal activities  Pt will be able to resume normals ADL's, IADL's, previous activities, fitness, and work related tasks     Plan   Plan of care Certification: 4/4/2024 to 6/28/2024.     Continue with current POC toward established PT goals. Progress shoulder mobility and postural strength as able.    Tammie Hall, PTA

## 2024-04-16 ENCOUNTER — CLINICAL SUPPORT (OUTPATIENT)
Dept: REHABILITATION | Facility: HOSPITAL | Age: 44
End: 2024-04-16
Payer: COMMERCIAL

## 2024-04-16 DIAGNOSIS — M25.611 SHOULDER JOINT STIFFNESS, BILATERAL: ICD-10-CM

## 2024-04-16 DIAGNOSIS — M25.612 SHOULDER JOINT STIFFNESS, BILATERAL: ICD-10-CM

## 2024-04-16 DIAGNOSIS — M25.522 UPPER ARM JOINT PAIN, LEFT: ICD-10-CM

## 2024-04-16 DIAGNOSIS — M25.69 BACK STIFFNESS: ICD-10-CM

## 2024-04-16 DIAGNOSIS — M25.512 ACUTE PAIN OF LEFT SHOULDER: Primary | ICD-10-CM

## 2024-04-16 PROCEDURE — 97110 THERAPEUTIC EXERCISES: CPT | Mod: PN,CQ

## 2024-04-16 PROCEDURE — 97140 MANUAL THERAPY 1/> REGIONS: CPT | Mod: PN,CQ

## 2024-04-23 ENCOUNTER — CLINICAL SUPPORT (OUTPATIENT)
Dept: REHABILITATION | Facility: HOSPITAL | Age: 44
End: 2024-04-23
Payer: COMMERCIAL

## 2024-04-23 DIAGNOSIS — M25.69 BACK STIFFNESS: ICD-10-CM

## 2024-04-23 DIAGNOSIS — M25.512 ACUTE PAIN OF LEFT SHOULDER: Primary | ICD-10-CM

## 2024-04-23 DIAGNOSIS — M25.522 UPPER ARM JOINT PAIN, LEFT: ICD-10-CM

## 2024-04-23 DIAGNOSIS — M25.611 SHOULDER JOINT STIFFNESS, BILATERAL: ICD-10-CM

## 2024-04-23 DIAGNOSIS — M25.612 SHOULDER JOINT STIFFNESS, BILATERAL: ICD-10-CM

## 2024-04-23 PROCEDURE — 97110 THERAPEUTIC EXERCISES: CPT | Mod: PN,CQ

## 2024-04-23 PROCEDURE — 97140 MANUAL THERAPY 1/> REGIONS: CPT | Mod: PN,CQ

## 2024-04-23 NOTE — PROGRESS NOTES
[facility]   Physical Therapy Treatment Note      Name: Portia rCuz  Clinic Number: [id number]    Therapy Diagnosis:   Encounter Diagnoses   Name Primary?    Acute pain of left shoulder Yes    Upper arm joint pain, left     Shoulder joint stiffness, bilateral     Back stiffness        Physician: Michael Alfaro MD    Visit Date: 4/23/2024    Physician Orders: PT Eval and Treat   Medical Diagnosis from Referral:  Left upper arm pain [M79.622]   Evaluation Date: 4/4/2024  Authorization Period Expiration: 3/19/2025  Plan of Care Expiration: 6/28/2024  Progress Note Due: 5/3/2024  Visit # / Visits authorized: 2 / 20 +eval   FOTO: Issued Visit #: 1/3, (capture on visit 1, 5, 10) first on 4/4/2024     PTA Visit #: 2/5     Precautions: Standard      Time In: 7:33  Time Out: 8:18  Total Appointment Time (timed & untimed codes): 45 minutes      Subjective     Patient reports: she has not had as much pain into the left shoulder throughout each day.   She was compliant with home exercise program.  Response to previous treatment: felt okay  Current Level of Function: avoiding lifting and carrying, reaching behind back, reaching overhead  Prior Level of Function: no limitations    Pain: 0/10 at rest, 6/10 at worst  Location:  L shoulder    Objective      Objective Measures updated at progress report unless specified.     Treatment     Portia received the treatments listed below:      therapeutic exercises to develop strength, endurance, ROM, flexibility, and posture for 25 minutes including:  Supine shoulder horizontal abduction YTB with towel roll b/w scaps 2 x 10  Supine thoracic extension stretch with chin tuck on towel roll b/w scaps 2 x 10  Supine thoracic extension stretch with chin tuck on towel roll b/w scaps + pec stretch with arms abducted (range as tolerated) 2 x 1 min  Supine thoracic extension stretch with chin tuck on towel roll b/w scaps + snow severino pec stretch (range as  "tolerated) 2 x 10  Seated L scalene and UT stretch 3x20"  Seated cervical retraction 15 x 5"  Seated bilateral ER YTB 2 x 10  Standing median nn glide x 15    manual therapy techniques: Joint mobilizations, Myofacial release, and Soft tissue Mobilization were applied to the: L shoulder for 15 minutes, including:  Posterior and inferior GH mobs grade II-III  Supine myofascial release, trigger point release to Left pec minor, coracobrachialis, SH biceps, LH biceps, brachialis.  Progressed to release at subscapularis, teres, latissimus dorsi, infraspinatus    neuromuscular re-education activities to improve: Coordination, Kinesthetic, Proprioception, Posture, and Motor Control for 5 minutes. The following activities were included:  Supine Internal Rotation/External Rotation against manual resistance at 0*  SL shoulder ER with scap stabilization 2 x 10 (towel under arm for comfort)    therapeutic activities to improve functional performance for 00  minutes, including:  May add at later date      Patient Education and Home Exercises       Education provided:   - importance of avoiding onset of pain, tingling, or numbness into the left arm  - activity modification as needed for pain  - importance of compliance to HEP    Written Home Exercises Provided: yes. Exercises were reviewed and Portia was able to demonstrate them prior to the end of the session.  Portia demonstrated good  understanding of the education provided. See Electronic Medical Record under Patient Instructions for exercises provided during therapy sessions    Assessment     Patient continues with some glenohumeral stiffness, especially with inferior glide. Able to improve mobility with manual techniques and then progress some gentle strengthening and stabilization for the scapulae. Still having some neural tightness with nerve glides and some functional activities but appears less than previous session. Will benefit from further progression of mobility " and strength with reduction of neural tension as able.    Portia Is progressing well towards her goals.   Patient prognosis is Good.     Patient will continue to benefit from skilled outpatient physical therapy to address the deficits listed in the problem list box on initial evaluation, provide pt/family education and to maximize pt's level of independence in the home and community environment.     Patient's spiritual, cultural and educational needs considered and pt agreeable to plan of care and goals.     Anticipated Barriers for therapy: Schedule conflicts, transportation, pain, guarding, tolerance, endurance, posture, postural awareness, joint and soft tissue deficits    Goals:   Short-Term: 4 weeks (5/3/2024)  Pt will improve bilateralActive shouder flexion to > or = 155 to promote return to prior functional status.  Pt will improve bilateralActive shouder abduction to > or = 160 to promote return to prior functional status.  Pt will improve leftPassive shouder external rotation to > or = 90 to promote return to prior functional status.  Pt will improve flexibility/mobility of her shoulder, upper arm, pecs, periscapular muscles to help promoted better mobility, posture, alignment, and help return to prior functional status.  Pt will demonstrate improved postural habits as evidenced by moderately improved upright posture with reduced forward head posture.  Pt will decrease pain levels < or = to 3/10 to help promote appropriate progression of PT, HEP, and ADL's    Pt will be compliant with new home exercise program to assist with PT intervention and help allow appropriate progression through plan of care.     Long-Term: 12 weeks (6/28/2024)  Pt will improve bilateral shoulder flexion/abduction AROM > or = 165 to allow return to normal activities of daily living.  Pt will improve bilateral shoulder external rotation PROM > or = 95 to allow return to normal activities of daily living.  Pt will improve bilateral  shoulder strength to > or = 5/5 to allow performance of activities of daily living.  Pt will improve trunk and scap-thoracic strength/stabilization to > or = to GOOD to allow better posture, alignment, and help return to prior functional status.  Pt will have > or = to GOOD flexibility/mobility of her shoulder, upper arm, pecs, periscapular muscles to help promoted better mobility, posture, alignment, and help return to prior functional status.  Pt will report < or = 1-2/10 pain during activities of daily living to improve QOL and allow return to prior functional status.   Pt will be compliant and independent with HEP to allow and maintain better participation in normal activities  Pt will be able to resume normals ADL's, IADL's, previous activities, fitness, and work related tasks     Plan   Plan of care Certification: 4/4/2024 to 6/28/2024.     Continue with current POC toward established PT goals. Progress shoulder mobility and postural strength as able.    Tammie Hall, PTA

## 2024-04-26 ENCOUNTER — CLINICAL SUPPORT (OUTPATIENT)
Dept: REHABILITATION | Facility: HOSPITAL | Age: 44
End: 2024-04-26
Payer: COMMERCIAL

## 2024-04-26 DIAGNOSIS — M25.611 SHOULDER JOINT STIFFNESS, BILATERAL: ICD-10-CM

## 2024-04-26 DIAGNOSIS — M25.612 SHOULDER JOINT STIFFNESS, BILATERAL: ICD-10-CM

## 2024-04-26 DIAGNOSIS — M25.512 ACUTE PAIN OF LEFT SHOULDER: Primary | ICD-10-CM

## 2024-04-26 DIAGNOSIS — M25.522 UPPER ARM JOINT PAIN, LEFT: ICD-10-CM

## 2024-04-26 DIAGNOSIS — M25.69 BACK STIFFNESS: ICD-10-CM

## 2024-04-26 PROCEDURE — 97140 MANUAL THERAPY 1/> REGIONS: CPT | Mod: PN

## 2024-04-26 NOTE — PROGRESS NOTES
[facility]   Physical Therapy Treatment Note      Name: Portia Cruz  Clinic Number: [id number]    Therapy Diagnosis:   Encounter Diagnoses   Name Primary?    Acute pain of left shoulder Yes    Upper arm joint pain, left     Shoulder joint stiffness, bilateral     Back stiffness      Physician: Michael Alfaro MD    Visit Date: 4/26/2024    Physician Orders: PT Eval and Treat   Medical Diagnosis from Referral:  Left upper arm pain [M79.622]   Evaluation Date: 4/4/2024  Authorization Period Expiration: 12/31/2024  Plan of Care Expiration: 6/28/2024  Progress Note Due: 5/3/2024  Visit # / Visits authorized: 3 / 20 +eval   FOTO: Issued Visit #: 1/3, (capture on visit 1, 5, 10) first on 4/4/2024     PTA Visit #: 0/5     Precautions: Standard      Time In: 6:45  Time Out: 7:30  Total Appointment Time (timed & untimed codes): 45 minutes      Subjective     Patient reports: she is doing better with pain at rest but will move the arm in some positions that can still cause c/o.  Shoulder and ROM felt much better following trigger point release today.    She was compliant with home exercise program.  Response to previous treatment: felt okay  Current Level of Function: avoiding lifting and carrying, reaching behind back, reaching overhead  Prior Level of Function: no limitations    Pain: 0/10 at rest, 6/10 at worst  Location:  L shoulder    Objective      Objective Measures updated at progress report unless specified.     Treatment     Portia received the treatments listed below:      therapeutic exercises to develop strength, endurance, ROM, flexibility, and posture for 00 minutes including:  Standing UBE x 3' each direction, 3.0 resistance  Supine shoulder horizontal abduction YTB with towel roll b/w scaps 2 x 10  Supine thoracic extension stretch with chin tuck on towel roll b/w scaps 2 x 10  Supine thoracic extension stretch with chin tuck on towel roll b/w scaps + pec stretch with arms  "abducted (range as tolerated) 2 x 1 min  Supine thoracic extension stretch with chin tuck on towel roll b/w scaps + snow severino pec stretch (range as tolerated) 2 x 10  Seated L scalene and UT stretch 3x20"  Seated cervical retraction 15 x 5"  Seated bilateral ER YTB 2 x 10  Standing median nn glide x 15    manual therapy techniques: Joint mobilizations, Myofacial release, and Soft tissue Mobilization were applied to the: L shoulder for 45 minutes, including:  Posterior and inferior GH mobs grade II-III  Supine myofascial release, trigger point release to Left pec minor, pec major, coracobrachialis, SH biceps, LH biceps, brachialis, middle and anterior deltoid.  Progressed to release at subscapularis, teres, latissimus dorsi, infraspinatus    neuromuscular re-education activities to improve: Coordination, Kinesthetic, Proprioception, Posture, and Motor Control for 00 minutes. The following activities were included:  Supine Internal Rotation/External Rotation against manual resistance at 0*  SL shoulder ER with scap stabilization 2 x 10 (towel under arm for comfort)    therapeutic activities to improve functional performance for 00  minutes, including:  May add at later date      Patient Education and Home Exercises       Education provided:   - importance of avoiding onset of pain, tingling, or numbness into the left arm  - activity modification as needed for pain  - importance of compliance to HEP    Written Home Exercises Provided: yes. Exercises were reviewed and Portia was able to demonstrate them prior to the end of the session.  Portia demonstrated good  understanding of the education provided. See Electronic Medical Record under Patient Instructions for exercises provided during therapy sessions    Assessment     Patient still presenting with some limited ROM with increased soft tissue restrictions.  Presence of trigger points that responded well to release mostly at middle and anterior deltoid, s.h. " biceps, coracobrachialis, pec major and minor.   Discussed potential for dry needling at future visit and pt stating she is very interested.  Will benefit from further progression of mobility and strength with reduction of neural tension as able.    Portia Is progressing well towards her goals.   Patient prognosis is Good.     Patient will continue to benefit from skilled outpatient physical therapy to address the deficits listed in the problem list box on initial evaluation, provide pt/family education and to maximize pt's level of independence in the home and community environment.     Patient's spiritual, cultural and educational needs considered and pt agreeable to plan of care and goals.     Anticipated Barriers for therapy: Schedule conflicts, transportation, pain, guarding, tolerance, endurance, posture, postural awareness, joint and soft tissue deficits    Goals:   Short-Term: 4 weeks (5/3/2024)  Pt will improve bilateralActive shouder flexion to > or = 155 to promote return to prior functional status.  Pt will improve bilateralActive shouder abduction to > or = 160 to promote return to prior functional status.  Pt will improve leftPassive shouder external rotation to > or = 90 to promote return to prior functional status.  Pt will improve flexibility/mobility of her shoulder, upper arm, pecs, periscapular muscles to help promoted better mobility, posture, alignment, and help return to prior functional status.  Pt will demonstrate improved postural habits as evidenced by moderately improved upright posture with reduced forward head posture.  Pt will decrease pain levels < or = to 3/10 to help promote appropriate progression of PT, HEP, and ADL's    Pt will be compliant with new home exercise program to assist with PT intervention and help allow appropriate progression through plan of care.     Long-Term: 12 weeks (6/28/2024)  Pt will improve bilateral shoulder flexion/abduction AROM > or = 165 to allow  return to normal activities of daily living.  Pt will improve bilateral shoulder external rotation PROM > or = 95 to allow return to normal activities of daily living.  Pt will improve bilateral shoulder strength to > or = 5/5 to allow performance of activities of daily living.  Pt will improve trunk and scap-thoracic strength/stabilization to > or = to GOOD to allow better posture, alignment, and help return to prior functional status.  Pt will have > or = to GOOD flexibility/mobility of her shoulder, upper arm, pecs, periscapular muscles to help promoted better mobility, posture, alignment, and help return to prior functional status.  Pt will report < or = 1-2/10 pain during activities of daily living to improve QOL and allow return to prior functional status.   Pt will be compliant and independent with HEP to allow and maintain better participation in normal activities  Pt will be able to resume normals ADL's, IADL's, previous activities, fitness, and work related tasks     Plan   Plan of care Certification: 4/4/2024 to 6/28/2024.     Continue with current POC toward established PT goals. Progress shoulder mobility and postural strength as able.    Matthias Escalera, PT

## 2024-04-29 ENCOUNTER — CLINICAL SUPPORT (OUTPATIENT)
Dept: REHABILITATION | Facility: HOSPITAL | Age: 44
End: 2024-04-29
Payer: COMMERCIAL

## 2024-04-29 DIAGNOSIS — M25.611 SHOULDER JOINT STIFFNESS, BILATERAL: ICD-10-CM

## 2024-04-29 DIAGNOSIS — M25.512 ACUTE PAIN OF LEFT SHOULDER: Primary | ICD-10-CM

## 2024-04-29 DIAGNOSIS — M25.522 UPPER ARM JOINT PAIN, LEFT: ICD-10-CM

## 2024-04-29 DIAGNOSIS — M25.69 BACK STIFFNESS: ICD-10-CM

## 2024-04-29 DIAGNOSIS — M25.612 SHOULDER JOINT STIFFNESS, BILATERAL: ICD-10-CM

## 2024-04-29 PROCEDURE — 97140 MANUAL THERAPY 1/> REGIONS: CPT | Mod: PN

## 2024-04-29 PROCEDURE — 97014 ELECTRIC STIMULATION THERAPY: CPT | Mod: PN

## 2024-04-29 NOTE — PROGRESS NOTES
[facility]   Physical Therapy Treatment Note      Name: Portia Cruz  Clinic Number: [id number]    Therapy Diagnosis:   Encounter Diagnoses   Name Primary?    Acute pain of left shoulder Yes    Upper arm joint pain, left     Shoulder joint stiffness, bilateral     Back stiffness      Physician: Michael Alfaro MD    Visit Date: 4/29/2024    Physician Orders: PT Eval and Treat   Medical Diagnosis from Referral:  Left upper arm pain [M79.622]   Evaluation Date: 4/4/2024  Authorization Period Expiration: 12/31/2024  Plan of Care Expiration: 6/28/2024  Progress Note Due: 5/3/2024  Visit # / Visits authorized: 4 / 20 +eval   FOTO: Issued Visit #: 1/3, (capture on visit 1, 5, 10) first on 4/4/2024     PTA Visit #: 0/5     Precautions: Standard      Time In: 5:30  Time Out: 6:30  Total Appointment Time (timed & untimed codes): 55 minutes      Subjective     Patient reports: she is doing better with pain at rest but will move the arm in some positions that can still cause c/o.  Did feel better and looser after her last visit.  Today she requested and consented to dry needling.  Stated she did feel better after dry needling.    She was compliant with home exercise program.  Response to previous treatment: felt better and looser  Current Level of Function: avoiding lifting and carrying, reaching behind back, reaching overhead  Prior Level of Function: no limitations    Pain: 0/10 at rest, 4-5/10 at worst  Location:  L shoulder    Objective      Objective Measures updated at progress report unless specified.     Treatment     Portia received the treatments listed below:      therapeutic exercises to develop strength, endurance, ROM, flexibility, and posture for 00 minutes including:  Standing UBE x 3' each direction, 3.0 resistance  Supine shoulder horizontal abduction YTB with towel roll b/w scaps 2 x 10  Supine thoracic extension stretch with chin tuck on towel roll b/w scaps 2 x  "10  Supine thoracic extension stretch with chin tuck on towel roll b/w scaps + pec stretch with arms abducted (range as tolerated) 2 x 1 min  Supine thoracic extension stretch with chin tuck on towel roll b/w scaps + snow severino pec stretch (range as tolerated) 2 x 10  Seated L scalene and UT stretch 3x20"  Seated cervical retraction 15 x 5"  Seated bilateral ER YTB 2 x 10  Standing median nn glide x 15    manual therapy techniques: Joint mobilizations, Myofacial release, and Soft tissue Mobilization were applied to the: L shoulder for 45 minutes, including:  Posterior and inferior GH mobs grade II-III  Supine myofascial release, trigger point release to Left pec minor, pec major, coracobrachialis, SH biceps, LH biceps, brachialis, middle and anterior deltoid.  Progressed to release at subscapularis, teres, latissimus dorsi, infraspinatus  Seated rib area of greatest restriction  Thoracic Sidebending technique  Supine 1st rib correction  Supine CTJ mobilization    Dry needling:  The pt expressed desire to receive trigger point dry needling today. Informed and written consent was obtained including benefits and risks of treatments, copy of signed consent form will be placed in patient's medical record. Verification of pt name, , and site of treatment was performed prior to treatment. Pt was positioned in supine to receive trigger point dry needling to Left anterior and middle deltoid with 40 mm serin J type needles. LTR achieved and pt reported familiar pain reproduction. Electro dry needling x 10 minutes.  The pt did not have adverse response to treatment. This was followed by STM to further decrease trigger point activity and pain, patient tolerated well. Post treatment patient demonstrated decreased pain per report following. Pt was educated to drink plenty of water following treatment to help with muscle soreness. Pt was educated to contact therapist post treatment as needed.     neuromuscular re-education " activities to improve: Coordination, Kinesthetic, Proprioception, Posture, and Motor Control for 00 minutes. The following activities were included:  Supine Internal Rotation/External Rotation against manual resistance at 0*  SL shoulder ER with scap stabilization 2 x 10 (towel under arm for comfort)    therapeutic activities to improve functional performance for 00  minutes, including:  May add at later date      Patient Education and Home Exercises       Education provided:   - importance of avoiding onset of pain, tingling, or numbness into the left arm  - activity modification as needed for pain  - importance of compliance to HEP    Written Home Exercises Provided: yes. Exercises were reviewed and Portia was able to demonstrate them prior to the end of the session.  Portia demonstrated good  understanding of the education provided. See Electronic Medical Record under Patient Instructions for exercises provided during therapy sessions    Assessment     Patient still presenting with some limited ROM with increased soft tissue restrictions.  Presence of trigger points that responded well to release mostly at middle and anterior deltoid (manual therapy and dry needling here), s.h. biceps, coracobrachialis, pec major and minor with manual therapy.   Some additional tightness that was noted with rib and CTJ restrictions.  Will benefit from further progression of mobility and strength with reduction of neural tension as able.    Portia Is progressing well towards her goals.   Patient prognosis is Good.     Patient will continue to benefit from skilled outpatient physical therapy to address the deficits listed in the problem list box on initial evaluation, provide pt/family education and to maximize pt's level of independence in the home and community environment.     Patient's spiritual, cultural and educational needs considered and pt agreeable to plan of care and goals.     Anticipated Barriers for therapy:  Schedule conflicts, transportation, pain, guarding, tolerance, endurance, posture, postural awareness, joint and soft tissue deficits    Goals:   Short-Term: 4 weeks (5/3/2024)  Pt will improve bilateralActive shouder flexion to > or = 155 to promote return to prior functional status.  Pt will improve bilateralActive shouder abduction to > or = 160 to promote return to prior functional status.  Pt will improve leftPassive shouder external rotation to > or = 90 to promote return to prior functional status.  Pt will improve flexibility/mobility of her shoulder, upper arm, pecs, periscapular muscles to help promoted better mobility, posture, alignment, and help return to prior functional status.  Pt will demonstrate improved postural habits as evidenced by moderately improved upright posture with reduced forward head posture.  Pt will decrease pain levels < or = to 3/10 to help promote appropriate progression of PT, HEP, and ADL's    Pt will be compliant with new home exercise program to assist with PT intervention and help allow appropriate progression through plan of care.     Long-Term: 12 weeks (6/28/2024)  Pt will improve bilateral shoulder flexion/abduction AROM > or = 165 to allow return to normal activities of daily living.  Pt will improve bilateral shoulder external rotation PROM > or = 95 to allow return to normal activities of daily living.  Pt will improve bilateral shoulder strength to > or = 5/5 to allow performance of activities of daily living.  Pt will improve trunk and scap-thoracic strength/stabilization to > or = to GOOD to allow better posture, alignment, and help return to prior functional status.  Pt will have > or = to GOOD flexibility/mobility of her shoulder, upper arm, pecs, periscapular muscles to help promoted better mobility, posture, alignment, and help return to prior functional status.  Pt will report < or = 1-2/10 pain during activities of daily living to improve QOL and allow return  to prior functional status.   Pt will be compliant and independent with HEP to allow and maintain better participation in normal activities  Pt will be able to resume normals ADL's, IADL's, previous activities, fitness, and work related tasks     Plan   Plan of care Certification: 4/4/2024 to 6/28/2024.     Continue with current POC toward established PT goals. Progress shoulder mobility and postural strength as able.    Matthias Escalera, PT

## 2024-05-03 ENCOUNTER — CLINICAL SUPPORT (OUTPATIENT)
Dept: REHABILITATION | Facility: HOSPITAL | Age: 44
End: 2024-05-03
Payer: COMMERCIAL

## 2024-05-03 DIAGNOSIS — M25.612 SHOULDER JOINT STIFFNESS, BILATERAL: ICD-10-CM

## 2024-05-03 DIAGNOSIS — M25.69 BACK STIFFNESS: ICD-10-CM

## 2024-05-03 DIAGNOSIS — M25.512 ACUTE PAIN OF LEFT SHOULDER: Primary | ICD-10-CM

## 2024-05-03 DIAGNOSIS — M25.522 UPPER ARM JOINT PAIN, LEFT: ICD-10-CM

## 2024-05-03 DIAGNOSIS — M25.611 SHOULDER JOINT STIFFNESS, BILATERAL: ICD-10-CM

## 2024-05-03 PROCEDURE — 97014 ELECTRIC STIMULATION THERAPY: CPT | Mod: PN

## 2024-05-03 PROCEDURE — 97140 MANUAL THERAPY 1/> REGIONS: CPT | Mod: PN

## 2024-05-03 NOTE — PROGRESS NOTES
[facility]   Physical Therapy Treatment Note      Name: Portia Cruz  Clinic Number: [id number]    Therapy Diagnosis:   Encounter Diagnoses   Name Primary?    Acute pain of left shoulder Yes    Upper arm joint pain, left     Shoulder joint stiffness, bilateral     Back stiffness      Physician: Michael Alfaro MD    Visit Date: 5/3/2024    Physician Orders: PT Eval and Treat   Medical Diagnosis from Referral:  Left upper arm pain [M79.622]   Evaluation Date: 4/4/2024  Authorization Period Expiration: 12/31/2024  Plan of Care Expiration: 6/28/2024  Progress Note Due: 5/3/2024  Visit # / Visits authorized: 4 / 20 +eval   FOTO: Issued Visit #: 1/3, (capture on visit 1, 5, 10) first on 4/4/2024     PTA Visit #: 0/5     Precautions: Standard      Time In: 6:40  Time Out: 7:35  Total Appointment Time (timed & untimed codes): 55 minutes      Subjective     Patient reports: she is doing better with pain at rest but will move the arm in some positions that can still cause c/o.  Did feel better and looser after her last visit.  Today she agreed to dry needling again.  Stated she did feel better after dry needling.    She was compliant with home exercise program.  Response to previous treatment: felt better and looser  Current Level of Function: avoiding lifting and carrying, reaching behind back, reaching overhead  Prior Level of Function: no limitations    Pain: 0/10 at rest, 3-4/10 at worst  Location:  L shoulder    Objective      Objective Measures updated at progress report unless specified.     Treatment     Portia received the treatments listed below:      therapeutic exercises to develop strength, endurance, ROM, flexibility, and posture for 00 minutes including:  Standing UBE x 3' each direction, 3.0 resistance  Supine shoulder horizontal abduction YTB with towel roll b/w scaps 2 x 10  Supine thoracic extension stretch with chin tuck on towel roll b/w scaps 2 x 10  Supine thoracic  "extension stretch with chin tuck on towel roll b/w scaps + pec stretch with arms abducted (range as tolerated) 2 x 1 min  Supine thoracic extension stretch with chin tuck on towel roll b/w scaps + snow severino pec stretch (range as tolerated) 2 x 10  Seated L scalene and UT stretch 3x20"  Seated cervical retraction 15 x 5"  Seated bilateral ER YTB 2 x 10  Standing median nn glide x 15    manual therapy techniques: Joint mobilizations, Myofacial release, and Soft tissue Mobilization were applied to the: L shoulder for 45 minutes, including:  Posterior and inferior GH mobs grade II-III  Supine myofascial release, trigger point release to Left pec minor, pec major, coracobrachialis, SH biceps, LH biceps, brachialis, middle and anterior deltoid.  Progressed to release at subscapularis, teres, latissimus dorsi, infraspinatus  Seated rib area of greatest restriction  Thoracic Sidebending technique  Supine 1st rib correction  Supine CTJ mobilization    Dry needling:  The pt expressed desire to receive trigger point dry needling today. Informed and written consent was obtained including benefits and risks of treatments, copy of signed consent form will be placed in patient's medical record. Verification of pt name, , and site of treatment was performed prior to treatment. Pt was positioned in supine to receive trigger point dry needling to Left middle deltoid with 40 mm serin J type needles. LTR achieved and pt reported familiar pain reproduction. Electro dry needling x 10 minutes.  The pt did not have adverse response to treatment. This was followed by STM to further decrease trigger point activity and pain, patient tolerated well. Post treatment patient demonstrated decreased pain per report following. Pt was educated to drink plenty of water following treatment to help with muscle soreness. Pt was educated to contact therapist post treatment as needed.     neuromuscular re-education activities to improve: Coordination, " Kinesthetic, Proprioception, Posture, and Motor Control for 00 minutes. The following activities were included:  Supine Internal Rotation/External Rotation against manual resistance at 0*  SL shoulder ER with scap stabilization 2 x 10 (towel under arm for comfort)    therapeutic activities to improve functional performance for 00  minutes, including:  May add at later date      Patient Education and Home Exercises       Education provided:   - importance of avoiding onset of pain, tingling, or numbness into the left arm  - activity modification as needed for pain  - importance of compliance to HEP    Written Home Exercises Provided: yes. Exercises were reviewed and Portia was able to demonstrate them prior to the end of the session.  Portia demonstrated good  understanding of the education provided. See Electronic Medical Record under Patient Instructions for exercises provided during therapy sessions    Assessment     Patient still presenting with some limited ROM with increased soft tissue restrictions.  Presence of trigger points that responded well to release mostly at middle deltoid (manual therapy and dry needling here), s.h. biceps, coracobrachialis, pec major and minor with manual therapy.   Some additional tightness that was noted with rib and CTJ restrictions.  Will benefit from further progression of mobility and strength with reduction of neural tension as able.    Portia Is progressing well towards her goals.   Patient prognosis is Good.     Patient will continue to benefit from skilled outpatient physical therapy to address the deficits listed in the problem list box on initial evaluation, provide pt/family education and to maximize pt's level of independence in the home and community environment.     Patient's spiritual, cultural and educational needs considered and pt agreeable to plan of care and goals.     Anticipated Barriers for therapy: Schedule conflicts, transportation, pain, guarding,  tolerance, endurance, posture, postural awareness, joint and soft tissue deficits    Goals:   Short-Term: 4 weeks (5/3/2024)  Pt will improve bilateralActive shouder flexion to > or = 155 to promote return to prior functional status.  Pt will improve bilateralActive shouder abduction to > or = 160 to promote return to prior functional status.  Pt will improve leftPassive shouder external rotation to > or = 90 to promote return to prior functional status.  Pt will improve flexibility/mobility of her shoulder, upper arm, pecs, periscapular muscles to help promoted better mobility, posture, alignment, and help return to prior functional status.  Pt will demonstrate improved postural habits as evidenced by moderately improved upright posture with reduced forward head posture.  Pt will decrease pain levels < or = to 3/10 to help promote appropriate progression of PT, HEP, and ADL's    Pt will be compliant with new home exercise program to assist with PT intervention and help allow appropriate progression through plan of care.     Long-Term: 12 weeks (6/28/2024)  Pt will improve bilateral shoulder flexion/abduction AROM > or = 165 to allow return to normal activities of daily living.  Pt will improve bilateral shoulder external rotation PROM > or = 95 to allow return to normal activities of daily living.  Pt will improve bilateral shoulder strength to > or = 5/5 to allow performance of activities of daily living.  Pt will improve trunk and scap-thoracic strength/stabilization to > or = to GOOD to allow better posture, alignment, and help return to prior functional status.  Pt will have > or = to GOOD flexibility/mobility of her shoulder, upper arm, pecs, periscapular muscles to help promoted better mobility, posture, alignment, and help return to prior functional status.  Pt will report < or = 1-2/10 pain during activities of daily living to improve QOL and allow return to prior functional status.   Pt will be compliant  and independent with HEP to allow and maintain better participation in normal activities  Pt will be able to resume normals ADL's, IADL's, previous activities, fitness, and work related tasks     Plan   Plan of care Certification: 4/4/2024 to 6/28/2024.     Continue with current POC toward established PT goals. Progress shoulder mobility and postural strength as able.    Matthias Escalera, PT

## 2024-05-06 NOTE — PROGRESS NOTES
[facility]   Physical Therapy Treatment Note      Name: Portia Cruz  Clinic Number: [id number]    Therapy Diagnosis:   Encounter Diagnoses   Name Primary?    Acute pain of left shoulder Yes    Upper arm joint pain, left     Shoulder joint stiffness, bilateral     Back stiffness        Physician: Michael Alfaro MD    Visit Date: 5/7/2024    Physician Orders: PT Eval and Treat   Medical Diagnosis from Referral:  Left upper arm pain [M79.622]   Evaluation Date: 4/4/2024  Authorization Period Expiration: 12/31/2024  Plan of Care Expiration: 6/28/2024  Progress Note Due: 5/3/2024  Visit # / Visits authorized: 6 / 20 +eval   FOTO: Issued Visit #: 1/3, (capture on visit 1, 5, 10) first on 4/4/2024     PTA Visit #: 1/5     Precautions: Standard      Time In: 6:47  Time Out: 7:33  Total Appointment Time (timed & untimed codes): 45 minutes      Subjective     Patient reports: seems to be doing a bit better. Overhead movements are still most problematic though the severity of pain and total ROM are better.     She was compliant with home exercise program.  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  Prior Level of Function: no limitations    Pain: 0/10 at rest, 3-4/10 at worst  Location:  L shoulder    Objective      Objective Measures updated at progress report unless specified.     Treatment     Portia received the treatments listed below:      therapeutic exercises to develop strength, endurance, ROM, flexibility, and posture for 15 minutes including:  Supine dowel flexion 2 x 10 w/ towel roll along spine  Supine shoulder horizontal abduction YTB with towel roll b/w scaps 2 x 10  Supine thoracic extension stretch with chin tuck on towel roll along spine + snow severino (elbows bent) 2 x 10  Standing median nn glide 2 x 10    Below NP this date:  Supine thoracic extension  "stretch with chin tuck on towel roll b/w scaps 2 x 10  Supine thoracic extension stretch with chin tuck on towel roll b/w scaps + pec stretch with arms abducted (range as tolerated) 2 x 1 min  Seated L scalene and UT stretch 3x20"  Seated cervical retraction 15 x 5"  Seated bilateral ER YTB 2 x 10  Standing UBE x 3' each direction, 3.0 resistance    manual therapy techniques: Joint mobilizations, Myofacial release, and Soft tissue Mobilization were applied to the: L shoulder for 30 minutes, including:  Posterior and inferior GH mobs grade II-III  Supine myofascial release, trigger point release to Left pec minor, pec major, coracobrachialis, SH biceps, LH biceps, brachialis, middle and anterior deltoid.  Progressed to release at subscapularis, teres, latissimus dorsi, infraspinatus  SL scapular mobs and fascial release as needed along lat  (NP) Seated rib area of greatest restriction  (NP) Thoracic Sidebending technique  (NP) Supine 1st rib correction  (NP) Supine CTJ mobilization      neuromuscular re-education activities to improve: Coordination, Kinesthetic, Proprioception, Posture, and Motor Control for 00 minutes. The following activities were included:  Supine Internal Rotation/External Rotation against manual resistance at 0*  SL shoulder ER with scap stabilization 2 x 10 (towel under arm for comfort)    therapeutic activities to improve functional performance for 00  minutes, including:  May add at later date      Patient Education and Home Exercises       Education provided:   - importance of avoiding onset of pain, tingling, or numbness into the left arm  - activity modification as needed for pain  - importance of compliance to HEP    Written Home Exercises Provided: yes. Exercises were reviewed and Portia was able to demonstrate them prior to the end of the session.  Portia demonstrated good  understanding of the education provided. See Electronic Medical Record under Patient Instructions for " exercises provided during therapy sessions    Assessment     Patient continues with some soft tissue restrictions and mild joint stiffness upon arrival which all loosened some with manual techniques. Most tenderness along the L UT, anterior pec/biceps attachments, and subscap. Less cervicothoracic limitations noted this date, tolerated completed exercises well for periscapular activation and scapular mobility.  Will benefit from further progression of mobility and strength with reduction of neural tension as able.    Portia Is progressing well towards her goals.   Patient prognosis is Good.     Patient will continue to benefit from skilled outpatient physical therapy to address the deficits listed in the problem list box on initial evaluation, provide pt/family education and to maximize pt's level of independence in the home and community environment.     Patient's spiritual, cultural and educational needs considered and pt agreeable to plan of care and goals.     Anticipated Barriers for therapy: Schedule conflicts, transportation, pain, guarding, tolerance, endurance, posture, postural awareness, joint and soft tissue deficits    Goals:   Short-Term: 4 weeks (5/3/2024)  Pt will improve bilateralActive shouder flexion to > or = 155 to promote return to prior functional status.  Pt will improve bilateralActive shouder abduction to > or = 160 to promote return to prior functional status.  Pt will improve leftPassive shouder external rotation to > or = 90 to promote return to prior functional status.  Pt will improve flexibility/mobility of her shoulder, upper arm, pecs, periscapular muscles to help promoted better mobility, posture, alignment, and help return to prior functional status.  Pt will demonstrate improved postural habits as evidenced by moderately improved upright posture with reduced forward head posture.  Pt will decrease pain levels < or = to 3/10 to help promote appropriate progression of PT, HEP,  and ADL's    Pt will be compliant with new home exercise program to assist with PT intervention and help allow appropriate progression through plan of care.     Long-Term: 12 weeks (6/28/2024)  Pt will improve bilateral shoulder flexion/abduction AROM > or = 165 to allow return to normal activities of daily living.  Pt will improve bilateral shoulder external rotation PROM > or = 95 to allow return to normal activities of daily living.  Pt will improve bilateral shoulder strength to > or = 5/5 to allow performance of activities of daily living.  Pt will improve trunk and scap-thoracic strength/stabilization to > or = to GOOD to allow better posture, alignment, and help return to prior functional status.  Pt will have > or = to GOOD flexibility/mobility of her shoulder, upper arm, pecs, periscapular muscles to help promoted better mobility, posture, alignment, and help return to prior functional status.  Pt will report < or = 1-2/10 pain during activities of daily living to improve QOL and allow return to prior functional status.   Pt will be compliant and independent with HEP to allow and maintain better participation in normal activities  Pt will be able to resume normals ADL's, IADL's, previous activities, fitness, and work related tasks     Plan   Plan of care Certification: 4/4/2024 to 6/28/2024.     Continue with current POC toward established PT goals. Progress shoulder mobility and postural strength as able.    Tammie Hall, PTA

## 2024-05-07 ENCOUNTER — CLINICAL SUPPORT (OUTPATIENT)
Dept: REHABILITATION | Facility: HOSPITAL | Age: 44
End: 2024-05-07
Payer: COMMERCIAL

## 2024-05-07 DIAGNOSIS — M25.512 ACUTE PAIN OF LEFT SHOULDER: Primary | ICD-10-CM

## 2024-05-07 DIAGNOSIS — M25.522 UPPER ARM JOINT PAIN, LEFT: ICD-10-CM

## 2024-05-07 DIAGNOSIS — M25.611 SHOULDER JOINT STIFFNESS, BILATERAL: ICD-10-CM

## 2024-05-07 DIAGNOSIS — M25.69 BACK STIFFNESS: ICD-10-CM

## 2024-05-07 DIAGNOSIS — M25.612 SHOULDER JOINT STIFFNESS, BILATERAL: ICD-10-CM

## 2024-05-07 PROCEDURE — 97140 MANUAL THERAPY 1/> REGIONS: CPT | Mod: PN,CQ

## 2024-05-07 PROCEDURE — 97110 THERAPEUTIC EXERCISES: CPT | Mod: PN,CQ

## 2024-05-08 NOTE — PROGRESS NOTES
[facility]   Physical Therapy Treatment Note      Name: Portia Cruz  Clinic Number: [id number]    Therapy Diagnosis:   No diagnosis found.    Physician: Michael Alfaro MD    Visit Date: 5/9/2024    Physician Orders: PT Eval and Treat   Medical Diagnosis from Referral:  Left upper arm pain [M79.622]   Evaluation Date: 4/4/2024  Authorization Period Expiration: 12/31/2024  Plan of Care Expiration: 6/28/2024  Progress Note Due: 5/31/2024 (Last Progress Note 5/9/2024)  Visit # / Visits authorized: 7 / 20 +eval   FOTO: Issued Visit #: 1/3, (capture on visit 1, 5, 10) first on 4/4/2024     PTA Visit #: 0/5     Precautions: Standard      Time In: 4:00  Time Out: 4:45  Total Appointment Time (timed & untimed codes): ***45 minutes      Subjective     Patient reports: seems to be doing a bit better. Overhead movements are still most problematic though the severity of pain and total ROM are better.     She was compliant with home exercise program.  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  Prior Level of Function: no limitations    Pain: ***0/10 at rest, 3-4/10 at worst  Location:  L shoulder    Objective    ***  Posture/alignment:  forward head posture, rounded shoulders.     Cervical ROM:  WFL-WNL     Sensation:  intact to light touch Bilateral Upper Extremities     Cervical Radiculopathy     Left  Right   Spurling's Test (A-sidebend) negative negative      Distraction Test negative    negative      MNTT positive    negative      UNTT negative    negative      RNTT negative    negative         Shoulder AROM (PROM):    Left  Right    Flexion  140 (152)  150 (163)   Abduction  155 (158)  165 (173)   External Rotation       (77)       (88)   Internal Rotation       (WNL)       (WNL)      Shoulder Strength:    Left  Right    Flexion  5/5 5/5   Scaption 5/5 5/5  "  Abduction 5/5 5/5   External Rotation 5/5 5/5   Internal Rotation 5/5 5/5                 Palpation:  Increased tone and tenderness to palpation noted Left>Right subscapularis, teres, infraspinatus, lats.  Presence of trigger points noted Left LH biceps and brachialis.     Glenohumeral joint:  good mobility     Scap-thoracic:  tight and guarded, tight Left>Right periscapular muscles.     Special Test Clusters:  + mild c/o impingement with H-K and Neer      Other Considerations:  Left Upper Extremity c/o TOS type symptoms with palpation and stretching of Left pecs with Left shoulder abducted  Left + MNTT more pronounced at wrist and lesser degree at elbow     Treatment     Portia received the treatments listed below:      therapeutic exercises to develop strength, endurance, ROM, flexibility, and posture for ***15 minutes including:  Supine dowel flexion 2 x 10 w/ towel roll along spine  Supine shoulder horizontal abduction YTB with towel roll b/w scaps 2 x 10  Supine thoracic extension stretch with chin tuck on towel roll along spine + snow severino (elbows bent) 2 x 10  Standing median nn glide 2 x 10    Below NP this date:  Supine thoracic extension stretch with chin tuck on towel roll b/w scaps 2 x 10  Supine thoracic extension stretch with chin tuck on towel roll b/w scaps + pec stretch with arms abducted (range as tolerated) 2 x 1 min  Seated L scalene and UT stretch 3x20"  Seated cervical retraction 15 x 5"  Seated bilateral ER YTB 2 x 10  Standing UBE x 3' each direction, 3.0 resistance    manual therapy techniques: Joint mobilizations, Myofacial release, and Soft tissue Mobilization were applied to the: L shoulder for ***30 minutes, including:  Posterior and inferior GH mobs grade II-III  Supine myofascial release, trigger point release to Left pec minor, pec major, coracobrachialis, SH biceps, LH biceps, brachialis, middle and anterior deltoid.  Progressed to release at subscapularis, teres, latissimus " dorsi, infraspinatus  SL scapular mobs and fascial release as needed along lat  (NP) Seated rib area of greatest restriction  (NP) Thoracic Sidebending technique  (NP) Supine 1st rib correction  (NP) Supine CTJ mobilization      neuromuscular re-education activities to improve: Coordination, Kinesthetic, Proprioception, Posture, and Motor Control for ***00 minutes. The following activities were included:  Supine Internal Rotation/External Rotation against manual resistance at 0*  SL shoulder ER with scap stabilization 2 x 10 (towel under arm for comfort)    therapeutic activities to improve functional performance for ***00  minutes, including:  May add at later date      Patient Education and Home Exercises       Education provided:   - importance of avoiding onset of pain, tingling, or numbness into the left arm  - activity modification as needed for pain  - importance of compliance to HEP    Written Home Exercises Provided: yes. Exercises were reviewed and Portia was able to demonstrate them prior to the end of the session.  Portia demonstrated good  understanding of the education provided. See Electronic Medical Record under Patient Instructions for exercises provided during therapy sessions    Assessment   ***  Patient continues with some soft tissue restrictions and mild joint stiffness upon arrival which all loosened some with manual techniques. Most tenderness along the L UT, anterior pec/biceps attachments, and subscap. Less cervicothoracic limitations noted this date, tolerated completed exercises well for periscapular activation and scapular mobility.  Will benefit from further progression of mobility and strength with reduction of neural tension as able.    Portia Is progressing well towards her goals.   Patient prognosis is Good.     Patient will continue to benefit from skilled outpatient physical therapy to address the deficits listed in the problem list box on initial evaluation, provide  pt/family education and to maximize pt's level of independence in the home and community environment.     Patient's spiritual, cultural and educational needs considered and pt agreeable to plan of care and goals.     Anticipated Barriers for therapy: Schedule conflicts, transportation, pain, guarding, tolerance, endurance, posture, postural awareness, joint and soft tissue deficits    Goals:   Short-Term: 4 weeks (5/3/2024)     ***  Pt will improve bilateralActive shouder flexion to > or = 155 to promote return to prior functional status.  Pt will improve bilateralActive shouder abduction to > or = 160 to promote return to prior functional status.  Pt will improve leftPassive shouder external rotation to > or = 90 to promote return to prior functional status.  Pt will improve flexibility/mobility of her shoulder, upper arm, pecs, periscapular muscles to help promoted better mobility, posture, alignment, and help return to prior functional status.  Pt will demonstrate improved postural habits as evidenced by moderately improved upright posture with reduced forward head posture.  Pt will decrease pain levels < or = to 3/10 to help promote appropriate progression of PT, HEP, and ADL's    Pt will be compliant with new home exercise program to assist with PT intervention and help allow appropriate progression through plan of care.     Long-Term: 12 weeks (6/28/2024)      ***  Pt will improve bilateral shoulder flexion/abduction AROM > or = 165 to allow return to normal activities of daily living.  Pt will improve bilateral shoulder external rotation PROM > or = 95 to allow return to normal activities of daily living.  Pt will improve bilateral shoulder strength to > or = 5/5 to allow performance of activities of daily living.  Pt will improve trunk and scap-thoracic strength/stabilization to > or = to GOOD to allow better posture, alignment, and help return to prior functional status.  Pt will have > or = to GOOD  flexibility/mobility of her shoulder, upper arm, pecs, periscapular muscles to help promoted better mobility, posture, alignment, and help return to prior functional status.  Pt will report < or = 1-2/10 pain during activities of daily living to improve QOL and allow return to prior functional status.   Pt will be compliant and independent with HEP to allow and maintain better participation in normal activities  Pt will be able to resume normals ADL's, IADL's, previous activities, fitness, and work related tasks     Plan   Plan of care Certification: 4/4/2024 to 6/28/2024.     Continue with current POC toward established PT goals. Progress shoulder mobility and postural strength as able.    Matthias Escalera, PT

## 2024-05-09 ENCOUNTER — CLINICAL SUPPORT (OUTPATIENT)
Dept: REHABILITATION | Facility: HOSPITAL | Age: 44
End: 2024-05-09
Payer: COMMERCIAL

## 2024-05-09 DIAGNOSIS — M25.611 SHOULDER JOINT STIFFNESS, BILATERAL: ICD-10-CM

## 2024-05-09 DIAGNOSIS — M25.522 UPPER ARM JOINT PAIN, LEFT: ICD-10-CM

## 2024-05-09 DIAGNOSIS — M25.69 BACK STIFFNESS: ICD-10-CM

## 2024-05-09 DIAGNOSIS — M25.612 SHOULDER JOINT STIFFNESS, BILATERAL: ICD-10-CM

## 2024-05-09 DIAGNOSIS — M25.512 ACUTE PAIN OF LEFT SHOULDER: Primary | ICD-10-CM

## 2024-05-09 PROCEDURE — 97110 THERAPEUTIC EXERCISES: CPT | Mod: PN,CQ

## 2024-05-09 PROCEDURE — 97140 MANUAL THERAPY 1/> REGIONS: CPT | Mod: PN,CQ

## 2024-05-09 NOTE — PROGRESS NOTES
[facility]   Physical Therapy Treatment Note      Name: Portia Cruz  Clinic Number: [id number]    Therapy Diagnosis:   Encounter Diagnoses   Name Primary?    Acute pain of left shoulder Yes    Upper arm joint pain, left     Shoulder joint stiffness, bilateral     Back stiffness        Physician: Michael Alfaro MD    Visit Date: 5/9/2024    Physician Orders: PT Eval and Treat   Medical Diagnosis from Referral:  Left upper arm pain [M79.622]   Evaluation Date: 4/4/2024  Authorization Period Expiration: 12/31/2024  Plan of Care Expiration: 6/28/2024  Progress Note Due: 5/3/2024  Visit # / Visits authorized: 7 / 20 +eval   FOTO: Issued Visit #: 1/3, (capture on visit 1, 5, 10) first on 4/4/2024     PTA Visit #: 2/5     Precautions: Standard      Time In: 4:05  Time Out: 4:50  Total Appointment Time (timed & untimed codes): 45 minutes      Subjective     Patient reports: not really having pain at rest but still getting discomfort with reaching overhead primarily.     She was compliant with home exercise program.  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  Prior Level of Function: no limitations    Pain: 0/10 at rest, 1-2/10 at worst the last couple days  Location:  L shoulder    Objective      Objective Measures updated at progress report unless specified.     Treatment     Portia received the treatments listed below:      therapeutic exercises to develop strength, endurance, ROM, flexibility, and posture for 15 minutes including:  Supine dowel flexion 2 x 10 w/ towel roll along spine  Supine shoulder horizontal abduction YTB with towel roll b/w scaps 2 x 10  Supine thoracic extension stretch with chin tuck on towel roll along spine + snow severino (elbows bent) 2 x 10  Standing median nn glide 2 x 10  Standing radial nn glide (modified spiderman) x 10    Below NP this  "date:  Supine thoracic extension stretch with chin tuck on towel roll b/w scaps 2 x 10  Supine thoracic extension stretch with chin tuck on towel roll b/w scaps + pec stretch with arms abducted (range as tolerated) 2 x 1 min  Seated L scalene and UT stretch 3x20"  Seated cervical retraction 15 x 5"  Seated bilateral ER YTB 2 x 10  Standing UBE x 3' each direction, 3.0 resistance    manual therapy techniques: Joint mobilizations, Myofacial release, and Soft tissue Mobilization were applied to the: L shoulder for 30 minutes, including:  Posterior and inferior GH mobs grade II-III  Supine myofascial release, trigger point release to Left pec minor, pec major, coracobrachialis, SH biceps, LH biceps, brachialis, middle and anterior deltoid.  Progressed to release at subscapularis, teres, latissimus dorsi, infraspinatus  SL scapular mobs and fascial release as needed along lat  (NP) Seated rib area of greatest restriction  (NP) Thoracic Sidebending technique  (NP) Supine 1st rib correction  (NP) Supine CTJ mobilization      neuromuscular re-education activities to improve: Coordination, Kinesthetic, Proprioception, Posture, and Motor Control for 00 minutes. The following activities were included:  Supine Internal Rotation/External Rotation against manual resistance at 0*  SL shoulder ER with scap stabilization 2 x 10 (towel under arm for comfort)    therapeutic activities to improve functional performance for 00  minutes, including:  May add at later date      Patient Education and Home Exercises       Education provided:   - importance of avoiding onset of pain, tingling, or numbness into the left arm  - activity modification as needed for pain  - importance of compliance to HEP    Written Home Exercises Provided: yes. Exercises were reviewed and Portia was able to demonstrate them prior to the end of the session.  Portia demonstrated good  understanding of the education provided. See Electronic Medical Record under " Patient Instructions for exercises provided during therapy sessions    Assessment     Patient continues with mild soft tissue restrictions throughout the periscapular musculature and left shoulder which is slowly improving. Reduced neural tension throughout treatment session. Cervicothoracic limitations are improving, tolerated light exercises well. Will benefit from further progression of mobility and strength with reduction of neural tension as able.    Portia Is progressing well towards her goals.   Patient prognosis is Good.     Patient will continue to benefit from skilled outpatient physical therapy to address the deficits listed in the problem list box on initial evaluation, provide pt/family education and to maximize pt's level of independence in the home and community environment.     Patient's spiritual, cultural and educational needs considered and pt agreeable to plan of care and goals.     Anticipated Barriers for therapy: Schedule conflicts, transportation, pain, guarding, tolerance, endurance, posture, postural awareness, joint and soft tissue deficits    Goals:   Short-Term: 4 weeks (5/3/2024)  Pt will improve bilateralActive shouder flexion to > or = 155 to promote return to prior functional status.  Pt will improve bilateralActive shouder abduction to > or = 160 to promote return to prior functional status.  Pt will improve leftPassive shouder external rotation to > or = 90 to promote return to prior functional status.  Pt will improve flexibility/mobility of her shoulder, upper arm, pecs, periscapular muscles to help promoted better mobility, posture, alignment, and help return to prior functional status.  Pt will demonstrate improved postural habits as evidenced by moderately improved upright posture with reduced forward head posture.  Pt will decrease pain levels < or = to 3/10 to help promote appropriate progression of PT, HEP, and ADL's    Pt will be compliant with new home exercise program  to assist with PT intervention and help allow appropriate progression through plan of care.     Long-Term: 12 weeks (6/28/2024)  Pt will improve bilateral shoulder flexion/abduction AROM > or = 165 to allow return to normal activities of daily living.  Pt will improve bilateral shoulder external rotation PROM > or = 95 to allow return to normal activities of daily living.  Pt will improve bilateral shoulder strength to > or = 5/5 to allow performance of activities of daily living.  Pt will improve trunk and scap-thoracic strength/stabilization to > or = to GOOD to allow better posture, alignment, and help return to prior functional status.  Pt will have > or = to GOOD flexibility/mobility of her shoulder, upper arm, pecs, periscapular muscles to help promoted better mobility, posture, alignment, and help return to prior functional status.  Pt will report < or = 1-2/10 pain during activities of daily living to improve QOL and allow return to prior functional status.   Pt will be compliant and independent with HEP to allow and maintain better participation in normal activities  Pt will be able to resume normals ADL's, IADL's, previous activities, fitness, and work related tasks     Plan   Plan of care Certification: 4/4/2024 to 6/28/2024.     Continue with current POC toward established PT goals. Progress shoulder mobility and postural strength as able.    Tammie Hall, PTA

## 2024-05-13 ENCOUNTER — CLINICAL SUPPORT (OUTPATIENT)
Dept: REHABILITATION | Facility: HOSPITAL | Age: 44
End: 2024-05-13
Payer: COMMERCIAL

## 2024-05-13 DIAGNOSIS — M25.512 ACUTE PAIN OF LEFT SHOULDER: Primary | ICD-10-CM

## 2024-05-13 DIAGNOSIS — M25.611 SHOULDER JOINT STIFFNESS, BILATERAL: ICD-10-CM

## 2024-05-13 DIAGNOSIS — M25.612 SHOULDER JOINT STIFFNESS, BILATERAL: ICD-10-CM

## 2024-05-13 DIAGNOSIS — M25.69 BACK STIFFNESS: ICD-10-CM

## 2024-05-13 DIAGNOSIS — M25.522 UPPER ARM JOINT PAIN, LEFT: ICD-10-CM

## 2024-05-13 PROCEDURE — 97140 MANUAL THERAPY 1/> REGIONS: CPT | Mod: PN

## 2024-05-13 NOTE — PROGRESS NOTES
[facility]   Physical Therapy Treatment Note      Name: Portia Cruz  Clinic Number: [id number]    Therapy Diagnosis:   Encounter Diagnoses   Name Primary?    Acute pain of left shoulder Yes    Upper arm joint pain, left     Shoulder joint stiffness, bilateral     Back stiffness      Physician: Michael Alfaro MD    Visit Date: 5/13/2024    Physician Orders: PT Eval and Treat   Medical Diagnosis from Referral:  Left upper arm pain [M79.622]   Evaluation Date: 4/4/2024  Authorization Period Expiration: 12/31/2024  Plan of Care Expiration: 6/28/2024  Progress Note Due: 6/7/2024 (Last Progress Note 5/13/2024)  Visit # / Visits authorized: 8 / 20 +eval   FOTO: Issued Visit #: 1/3, (capture on visit 1, 5, 10) first on 4/4/2024     PTA Visit #: 0/5     Precautions: Standard      Time In: 4:00  Time Out: 4:45  Total Appointment Time (timed & untimed codes): 45 minutes      Subjective     Patient reports: she was doing some stuff shoulder height and starting getting a pinching pain in the shoulder with throbbing in the shoulder.  Felt like the c/o was much better following PT today as the resting c/o was mostly resolved but still aware of some minor tightness along inside of upper arm.     She was compliant with home exercise program.  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  Prior Level of Function: no limitations    Pain: 0/10 at rest, 4-5/10 the last couple days with throbbing type pain since Sunday  Location:  L shoulder    Objective      Posture/alignment:  forward head posture, rounded shoulders. (Moderately improved)       Cervical Radiculopathy     Left  Right   Spurling's Test (A-sidebend) negative negative      Distraction Test negative    negative      MNTT positive    negative      UNTT negative    negative      RNTT negative    negative         Shoulder  "AROM (PROM):    Left  Right    Flexion  147 was 140 (157 was 152)  150 (163)   Abduction  162 was 155 (169 was 158)  165 (173)   External Rotation       (84 was 77)       (88)   Internal Rotation       (WNL)       (WNL)      Shoulder Strength:    Left  Right    Flexion  5/5 5/5   Scaption 5/5 5/5   Abduction 5/5 5/5   External Rotation 5/5 5/5   Internal Rotation 5/5 5/5                 Palpation:  Increased tone and tenderness to palpation noted Left>Right subscapularis, teres, infraspinatus, latissimus dorsi (much improved here).  Presence of trigger points still noted Left SH biceps, coracobrachialis, anterior deltoid.     Glenohumeral joint:  good mobility     Scap-thoracic:  tight and guarded, tight Left>Right periscapular muscles.     Special Test Clusters:  + mild/slight c/o impingement with H-K and Neer      Other Considerations: (mostly resolved here)  Left Upper Extremity c/o TOS type symptoms with palpation and stretching of Left pecs with Left shoulder abducted  Left + MNTT more pronounced at wrist and lesser degree at elbow    Treatment     Portia received the treatments listed below:      therapeutic exercises to develop strength, endurance, ROM, flexibility, and posture for 00 minutes including:  Supine dowel flexion 2 x 10 w/ towel roll along spine  Supine shoulder horizontal abduction YTB with towel roll b/w scaps 2 x 10  Supine thoracic extension stretch with chin tuck on towel roll along spine + snow severino (elbows bent) 2 x 10  Standing median nn glide 2 x 10  Standing radial nn glide (modified spiderman) x 10    Below NP this date:  Supine thoracic extension stretch with chin tuck on towel roll b/w scaps 2 x 10  Supine thoracic extension stretch with chin tuck on towel roll b/w scaps + pec stretch with arms abducted (range as tolerated) 2 x 1 min  Seated L scalene and UT stretch 3x20"  Seated cervical retraction 15 x 5"  Seated bilateral ER YTB 2 x 10  Standing UBE x 3' each direction, 3.0 " resistance    manual therapy techniques: Joint mobilizations, Myofacial release, and Soft tissue Mobilization were applied to the: L shoulder for 45 minutes, including:  Posterior and inferior GH mobs grade II-III  Supine myofascial release, trigger point release to Left pec minor, pec major, coracobrachialis, SH biceps, LH biceps, brachialis, middle and anterior deltoid.  Progressed to release at subscapularis, teres, latissimus dorsi, infraspinatus  SL scapular mobs and fascial release as needed along lat  (NP) Seated rib area of greatest restriction  (NP) Thoracic Sidebending technique  (NP) Supine 1st rib correction  (NP) Supine CTJ mobilization      neuromuscular re-education activities to improve: Coordination, Kinesthetic, Proprioception, Posture, and Motor Control for 00 minutes. The following activities were included:  Supine Internal Rotation/External Rotation against manual resistance at 0*  SL shoulder ER with scap stabilization 2 x 10 (towel under arm for comfort)    therapeutic activities to improve functional performance for 00  minutes, including:  May add at later date      Patient Education and Home Exercises       Education provided:   - importance of avoiding onset of pain, tingling, or numbness into the left arm  - activity modification as needed for pain  - importance of compliance to HEP    Written Home Exercises Provided: yes. Exercises were reviewed and Portia was able to demonstrate them prior to the end of the session.  Portia demonstrated good  understanding of the education provided. See Electronic Medical Record under Patient Instructions for exercises provided during therapy sessions    Assessment     Patient continues with some soft tissue restrictions and guarding that has been better but regressed some the past few days.  Was able to reduce most of the c/o with manual therapy today with pt reporting very mild residual c/o by end of treatment.  ROM was much smoother.    Portia  Is progressing well towards her goals.   Patient prognosis is Good.     Patient will continue to benefit from skilled outpatient physical therapy to address the deficits listed in the problem list box on initial evaluation, provide pt/family education and to maximize pt's level of independence in the home and community environment.     Patient's spiritual, cultural and educational needs considered and pt agreeable to plan of care and goals.     Anticipated Barriers for therapy: Schedule conflicts, transportation, pain, guarding, tolerance, endurance, posture, postural awareness, joint and soft tissue deficits    Goals:   Short-Term: 4 weeks (5/3/2024)        partially met  Pt will improve bilateralActive shouder flexion to > or = 155 to promote return to prior functional status.  Pt will improve bilateralActive shouder abduction to > or = 160 to promote return to prior functional status.  Pt will improve leftPassive shouder external rotation to > or = 90 to promote return to prior functional status.  Pt will improve flexibility/mobility of her shoulder, upper arm, pecs, periscapular muscles to help promoted better mobility, posture, alignment, and help return to prior functional status.  Pt will demonstrate improved postural habits as evidenced by moderately improved upright posture with reduced forward head posture.  Pt will decrease pain levels < or = to 3/10 to help promote appropriate progression of PT, HEP, and ADL's    Pt will be compliant with new home exercise program to assist with PT intervention and help allow appropriate progression through plan of care.     Long-Term: 12 weeks (6/28/2024)       ongoing  Pt will improve bilateral shoulder flexion/abduction AROM > or = 165 to allow return to normal activities of daily living.  Pt will improve bilateral shoulder external rotation PROM > or = 95 to allow return to normal activities of daily living.  Pt will improve bilateral shoulder strength to > or = 5/5  to allow performance of activities of daily living.  Pt will improve trunk and scap-thoracic strength/stabilization to > or = to GOOD to allow better posture, alignment, and help return to prior functional status.  Pt will have > or = to GOOD flexibility/mobility of her shoulder, upper arm, pecs, periscapular muscles to help promoted better mobility, posture, alignment, and help return to prior functional status.  Pt will report < or = 1-2/10 pain during activities of daily living to improve QOL and allow return to prior functional status.   Pt will be compliant and independent with HEP to allow and maintain better participation in normal activities  Pt will be able to resume normals ADL's, IADL's, previous activities, fitness, and work related tasks     Plan   Plan of care Certification: 4/4/2024 to 6/28/2024.     Continue with current POC toward established PT goals. Progress shoulder mobility and postural strength as able.    Matthias Escalera, PT

## 2024-05-16 ENCOUNTER — CLINICAL SUPPORT (OUTPATIENT)
Dept: REHABILITATION | Facility: HOSPITAL | Age: 44
End: 2024-05-16
Payer: COMMERCIAL

## 2024-05-16 DIAGNOSIS — M25.612 SHOULDER JOINT STIFFNESS, BILATERAL: ICD-10-CM

## 2024-05-16 DIAGNOSIS — M25.69 BACK STIFFNESS: ICD-10-CM

## 2024-05-16 DIAGNOSIS — M25.512 ACUTE PAIN OF LEFT SHOULDER: Primary | ICD-10-CM

## 2024-05-16 DIAGNOSIS — M25.522 UPPER ARM JOINT PAIN, LEFT: ICD-10-CM

## 2024-05-16 DIAGNOSIS — M25.611 SHOULDER JOINT STIFFNESS, BILATERAL: ICD-10-CM

## 2024-05-16 PROCEDURE — 97112 NEUROMUSCULAR REEDUCATION: CPT | Mod: PN,CQ

## 2024-05-16 PROCEDURE — 97110 THERAPEUTIC EXERCISES: CPT | Mod: PN,CQ

## 2024-05-16 PROCEDURE — 97140 MANUAL THERAPY 1/> REGIONS: CPT | Mod: PN,CQ

## 2024-05-16 NOTE — PROGRESS NOTES
[facility]   Physical Therapy Treatment Note      Name: Portia Cruz  Clinic Number: [id number]    Therapy Diagnosis:   Encounter Diagnoses   Name Primary?    Acute pain of left shoulder Yes    Upper arm joint pain, left     Shoulder joint stiffness, bilateral     Back stiffness        Physician: Michael Alfaro MD    Visit Date: 5/16/2024    Physician Orders: PT Eval and Treat   Medical Diagnosis from Referral:  Left upper arm pain [M79.622]   Evaluation Date: 4/4/2024  Authorization Period Expiration: 12/31/2024  Plan of Care Expiration: 6/28/2024  Progress Note Due: 6/7/2024 (Last Progress Note 5/13/2024)  Visit # / Visits authorized: 9 / 20 +eval   FOTO: Issued Visit #: 1/3, (capture on visit 1, 5, 10) first on 4/4/2024     PTA Visit #: 1/5     Precautions: Standard      Time In: 6:50  Time Out: 7:40  Total Appointment Time (timed & untimed codes): 50 minutes      Subjective     Patient reports: the symptoms she was having earlier this week have improved. Currently having some discomfort with reaching up and out.     She was compliant with home exercise program.  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  Prior Level of Function: no limitations    Pain: 0/10 at rest, 4-5/10 the last couple days with throbbing type pain since Sunday  Location:  L shoulder    Objective      Objective measures taken at reassessment unless otherwise specified.    Treatment     Portia received the treatments listed below:      therapeutic exercises to develop strength, endurance, ROM, flexibility, and posture for 10 minutes including:  NP--Supine dowel flexion 2 x 10 w/ towel roll along spine  Supine Internal Rotation/External Rotation at 90 deg abduction for muscle control through full ROM  Supine shoulder horizontal abduction RTB with towel roll b/w scaps 2 x 10  Supine  "opposite arm flex/ext with RTB x 10 each  NP--Supine thoracic extension stretch with chin tuck on towel roll along spine + snow severino (elbows bent) 2 x 10  Standing median nn glide 2 x 10  NP--Standing radial nn glide (modified spiderman) x 10    Below NP this date:  Supine thoracic extension stretch with chin tuck on towel roll b/w scaps 2 x 10  Supine thoracic extension stretch with chin tuck on towel roll b/w scaps + pec stretch with arms abducted (range as tolerated) 2 x 1 min  Seated L scalene and UT stretch 3x20"  Seated cervical retraction 15 x 5"  Seated bilateral ER YTB 2 x 10  Standing UBE x 3' each direction, 3.0 resistance    manual therapy techniques: Joint mobilizations, Myofacial release, and Soft tissue Mobilization were applied to the: L shoulder for 25 minutes, including:  Posterior and inferior GH mobs grade II-III  NP--Supine myofascial release, trigger point release to Left pec minor, pec major, coracobrachialis, SH biceps, LH biceps, brachialis, middle and anterior deltoid.  Progressed to release at subscapularis, teres, latissimus dorsi, infraspinatus  IASTM and silicone cupping to teres minor and infraspinatus - improved ER afterward  SL scapular mobs and fascial release as needed along lat  (NP) Seated rib area of greatest restriction  (NP) Thoracic Sidebending technique  (NP) Supine 1st rib correction  (NP) Supine CTJ mobilization      neuromuscular re-education activities to improve: Coordination, Kinesthetic, Proprioception, Posture, and Motor Control for 15 minutes. The following activities were included:  Prone scap retract 10 x 5"  Prone scap retract + extension 10 x 5"  Prone T x 10  Prone W x 10  SL shoulder ER with scap stabilization 2 x 10 (towel under arm for comfort)    therapeutic activities to improve functional performance for 00  minutes, including:  May add at later date      Patient Education and Home Exercises       Education provided:   - importance of avoiding onset of " pain, tingling, or numbness into the left arm  - activity modification as needed for pain  - importance of compliance to HEP    Written Home Exercises Provided: yes. Exercises were reviewed and Portia was able to demonstrate them prior to the end of the session.  Portia demonstrated good  understanding of the education provided. See Electronic Medical Record under Patient Instructions for exercises provided during therapy sessions    Assessment     Patient with good tolerance to progression of exercises this sindi without symptom provocation to the left shoulder. Point tenderness to the left teres minor and infraspinatus with manual techniques but loosened with IASTM and allowed for improved ROM. Challenged for proper scapular stabilization with prone exercises, muscle fatigue by end of session. Will benefit from further postural strengthening and stabilization as able.    Portia Is progressing well towards her goals.   Patient prognosis is Good.     Patient will continue to benefit from skilled outpatient physical therapy to address the deficits listed in the problem list box on initial evaluation, provide pt/family education and to maximize pt's level of independence in the home and community environment.     Patient's spiritual, cultural and educational needs considered and pt agreeable to plan of care and goals.     Anticipated Barriers for therapy: Schedule conflicts, transportation, pain, guarding, tolerance, endurance, posture, postural awareness, joint and soft tissue deficits    Goals:   Short-Term: 4 weeks (5/3/2024)        partially met  Pt will improve bilateralActive shouder flexion to > or = 155 to promote return to prior functional status.  Pt will improve bilateralActive shouder abduction to > or = 160 to promote return to prior functional status.  Pt will improve leftPassive shouder external rotation to > or = 90 to promote return to prior functional status.  Pt will improve flexibility/mobility  of her shoulder, upper arm, pecs, periscapular muscles to help promoted better mobility, posture, alignment, and help return to prior functional status.  Pt will demonstrate improved postural habits as evidenced by moderately improved upright posture with reduced forward head posture.  Pt will decrease pain levels < or = to 3/10 to help promote appropriate progression of PT, HEP, and ADL's    Pt will be compliant with new home exercise program to assist with PT intervention and help allow appropriate progression through plan of care.     Long-Term: 12 weeks (6/28/2024)       ongoing  Pt will improve bilateral shoulder flexion/abduction AROM > or = 165 to allow return to normal activities of daily living.  Pt will improve bilateral shoulder external rotation PROM > or = 95 to allow return to normal activities of daily living.  Pt will improve bilateral shoulder strength to > or = 5/5 to allow performance of activities of daily living.  Pt will improve trunk and scap-thoracic strength/stabilization to > or = to GOOD to allow better posture, alignment, and help return to prior functional status.  Pt will have > or = to GOOD flexibility/mobility of her shoulder, upper arm, pecs, periscapular muscles to help promoted better mobility, posture, alignment, and help return to prior functional status.  Pt will report < or = 1-2/10 pain during activities of daily living to improve QOL and allow return to prior functional status.   Pt will be compliant and independent with HEP to allow and maintain better participation in normal activities  Pt will be able to resume normals ADL's, IADL's, previous activities, fitness, and work related tasks     Plan   Plan of care Certification: 4/4/2024 to 6/28/2024.     Continue with current POC toward established PT goals. Progress shoulder mobility and postural strength as able.    Tammie Hall, PTA

## 2024-05-20 ENCOUNTER — CLINICAL SUPPORT (OUTPATIENT)
Dept: REHABILITATION | Facility: HOSPITAL | Age: 44
End: 2024-05-20
Payer: COMMERCIAL

## 2024-05-20 DIAGNOSIS — M25.612 SHOULDER JOINT STIFFNESS, BILATERAL: ICD-10-CM

## 2024-05-20 DIAGNOSIS — M25.69 BACK STIFFNESS: ICD-10-CM

## 2024-05-20 DIAGNOSIS — M25.512 ACUTE PAIN OF LEFT SHOULDER: Primary | ICD-10-CM

## 2024-05-20 DIAGNOSIS — M25.522 UPPER ARM JOINT PAIN, LEFT: ICD-10-CM

## 2024-05-20 DIAGNOSIS — M25.611 SHOULDER JOINT STIFFNESS, BILATERAL: ICD-10-CM

## 2024-05-20 PROCEDURE — 97140 MANUAL THERAPY 1/> REGIONS: CPT | Mod: PN

## 2024-05-20 NOTE — PROGRESS NOTES
[facility]   Physical Therapy Treatment Note      Name: Portia Cruz  Clinic Number: [id number]    Therapy Diagnosis:   Encounter Diagnoses   Name Primary?    Acute pain of left shoulder Yes    Upper arm joint pain, left     Shoulder joint stiffness, bilateral     Back stiffness      Physician: Michael Alfaro MD    Visit Date: 5/20/2024    Physician Orders: PT Eval and Treat   Medical Diagnosis from Referral:  Left upper arm pain [M79.622]   Evaluation Date: 4/4/2024  Authorization Period Expiration: 12/31/2024  Plan of Care Expiration: 6/28/2024  Progress Note Due: 6/7/2024 (Last Progress Note 5/13/2024)  Visit # / Visits authorized: 10 / 20 +eval   FOTO: Issued Visit #: 1/3, (capture on visit 1, 5, 10) first on 4/4/2024     PTA Visit #: 0/5     Precautions: Standard      Time In: 4:00  Time Out: 4:45  Total Appointment Time (timed & untimed codes): 45 minutes      Subjective     Patient reports: she is doing better but still aware of some tightness along the inside of her arm.  Been trying to do more home exercise program with thoracic extension, pec stretching, shoulder ROM.     She was compliant with home exercise program.  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  Prior Level of Function: no limitations    Pain: 0/10 at rest, 4-5/10 the last couple days with throbbing type pain since Sunday  Location:  L shoulder    Objective      Objective measures taken at reassessment unless otherwise specified.    Treatment     Portia received the treatments listed below:      therapeutic exercises to develop strength, endurance, ROM, flexibility, and posture for 00 minutes including:  NP--Supine dowel flexion 2 x 10 w/ towel roll along spine  Supine Internal Rotation/External Rotation at 90 deg abduction for muscle control through full ROM  Supine shoulder horizontal  "abduction RTB with towel roll b/w scaps 2 x 10  Supine opposite arm flex/ext with RTB x 10 each  NP--Supine thoracic extension stretch with chin tuck on towel roll along spine + snow severino (elbows bent) 2 x 10  Standing median nn glide 2 x 10  NP--Standing radial nn glide (modified spiderman) x 10    Below NP this date:  Supine thoracic extension stretch with chin tuck on towel roll b/w scaps 2 x 10  Supine thoracic extension stretch with chin tuck on towel roll b/w scaps + pec stretch with arms abducted (range as tolerated) 2 x 1 min  Seated L scalene and UT stretch 3x20"  Seated cervical retraction 15 x 5"  Seated bilateral ER YTB 2 x 10  Standing UBE x 3' each direction, 3.0 resistance    manual therapy techniques: Joint mobilizations, Myofacial release, and Soft tissue Mobilization were applied to the: L shoulder for 45 minutes, including:  Posterior and inferior GH mobs grade II-III  NP--Supine myofascial release, trigger point release to Left pec minor, pec major, coracobrachialis, SH biceps, LH biceps, brachialis, middle and anterior deltoid.  Progressed to release at subscapularis, teres, latissimus dorsi, infraspinatus  IASTM and silicone cupping to teres minor and infraspinatus - improved ER afterward  SL scapular mobs and fascial release as needed along lat  (NP) Seated rib area of greatest restriction  (NP) Thoracic Sidebending technique  (NP) Supine 1st rib correction  (NP) Supine CTJ mobilization      neuromuscular re-education activities to improve: Coordination, Kinesthetic, Proprioception, Posture, and Motor Control for 00 minutes. The following activities were included:  Prone scap retract 10 x 5"  Prone scap retract + extension 10 x 5"  Prone T x 10  Prone W x 10  SL shoulder ER with scap stabilization 2 x 10 (towel under arm for comfort)    therapeutic activities to improve functional performance for 00  minutes, including:  May add at later date      Patient Education and Home Exercises   "     Education provided:   - importance of avoiding onset of pain, tingling, or numbness into the left arm  - activity modification as needed for pain  - importance of compliance to HEP    Written Home Exercises Provided: yes. Exercises were reviewed and Portia was able to demonstrate them prior to the end of the session.  Portia demonstrated good  understanding of the education provided. See Electronic Medical Record under Patient Instructions for exercises provided during therapy sessions    Assessment     Patient with continued tightness along the inside of her Left arm.  Worked on release of pecs and biceps muscles.  Discussed with pt to do home exercise program after PT today since more time was spent on MT. Will benefit from further postural strengthening and stabilization as able.    Portia Is progressing well towards her goals.   Patient prognosis is Good.     Patient will continue to benefit from skilled outpatient physical therapy to address the deficits listed in the problem list box on initial evaluation, provide pt/family education and to maximize pt's level of independence in the home and community environment.     Patient's spiritual, cultural and educational needs considered and pt agreeable to plan of care and goals.     Anticipated Barriers for therapy: Schedule conflicts, transportation, pain, guarding, tolerance, endurance, posture, postural awareness, joint and soft tissue deficits    Goals:   Short-Term: 4 weeks (5/3/2024)        partially met  Pt will improve bilateralActive shouder flexion to > or = 155 to promote return to prior functional status.  Pt will improve bilateralActive shouder abduction to > or = 160 to promote return to prior functional status.  Pt will improve leftPassive shouder external rotation to > or = 90 to promote return to prior functional status.  Pt will improve flexibility/mobility of her shoulder, upper arm, pecs, periscapular muscles to help promoted better  mobility, posture, alignment, and help return to prior functional status.  Pt will demonstrate improved postural habits as evidenced by moderately improved upright posture with reduced forward head posture.  Pt will decrease pain levels < or = to 3/10 to help promote appropriate progression of PT, HEP, and ADL's    Pt will be compliant with new home exercise program to assist with PT intervention and help allow appropriate progression through plan of care.     Long-Term: 12 weeks (6/28/2024)       ongoing  Pt will improve bilateral shoulder flexion/abduction AROM > or = 165 to allow return to normal activities of daily living.  Pt will improve bilateral shoulder external rotation PROM > or = 95 to allow return to normal activities of daily living.  Pt will improve bilateral shoulder strength to > or = 5/5 to allow performance of activities of daily living.  Pt will improve trunk and scap-thoracic strength/stabilization to > or = to GOOD to allow better posture, alignment, and help return to prior functional status.  Pt will have > or = to GOOD flexibility/mobility of her shoulder, upper arm, pecs, periscapular muscles to help promoted better mobility, posture, alignment, and help return to prior functional status.  Pt will report < or = 1-2/10 pain during activities of daily living to improve QOL and allow return to prior functional status.   Pt will be compliant and independent with HEP to allow and maintain better participation in normal activities  Pt will be able to resume normals ADL's, IADL's, previous activities, fitness, and work related tasks     Plan   Plan of care Certification: 4/4/2024 to 6/28/2024.     Continue with current POC toward established PT goals. Progress shoulder mobility and postural strength as able.    Matthias Escalera, PT

## 2024-05-22 ENCOUNTER — CLINICAL SUPPORT (OUTPATIENT)
Dept: REHABILITATION | Facility: HOSPITAL | Age: 44
End: 2024-05-22
Payer: COMMERCIAL

## 2024-05-22 DIAGNOSIS — M25.512 ACUTE PAIN OF LEFT SHOULDER: Primary | ICD-10-CM

## 2024-05-22 DIAGNOSIS — M25.612 SHOULDER JOINT STIFFNESS, BILATERAL: ICD-10-CM

## 2024-05-22 DIAGNOSIS — M25.611 SHOULDER JOINT STIFFNESS, BILATERAL: ICD-10-CM

## 2024-05-22 DIAGNOSIS — M25.522 UPPER ARM JOINT PAIN, LEFT: ICD-10-CM

## 2024-05-22 DIAGNOSIS — M25.69 BACK STIFFNESS: ICD-10-CM

## 2024-05-22 PROCEDURE — 97014 ELECTRIC STIMULATION THERAPY: CPT | Mod: PN

## 2024-05-22 PROCEDURE — 97140 MANUAL THERAPY 1/> REGIONS: CPT | Mod: PN

## 2024-05-22 NOTE — PROGRESS NOTES
[facility]   Physical Therapy Treatment Note      Name: Portia Cruz  Clinic Number: [id number]    Therapy Diagnosis:   Encounter Diagnoses   Name Primary?    Acute pain of left shoulder Yes    Upper arm joint pain, left     Shoulder joint stiffness, bilateral     Back stiffness      Physician: Michael Alfaro MD    Visit Date: 5/22/2024    Physician Orders: PT Eval and Treat   Medical Diagnosis from Referral:  Left upper arm pain [M79.622]   Evaluation Date: 4/4/2024  Authorization Period Expiration: 12/31/2024  Plan of Care Expiration: 6/28/2024  Progress Note Due: 6/7/2024 (Last Progress Note 5/13/2024)  Visit # / Visits authorized: 11 / 20 +eval   FOTO: Issued Visit #: 1/3, (capture on visit 1, 5, 10) first on 4/4/2024     PTA Visit #: 0/5     Precautions: Standard      Time In: 6:40  Time Out: 7:35  Total Appointment Time (timed & untimed codes): 50 minutes      Subjective     Patient reports: she is doing better but still aware of some tightness along the inside of her arm.  Been trying to do more home exercise program with thoracic extension, pec stretching, shoulder ROM.   Now she just feels she is more limited with EROM overhead movements and activities.  Otherwise doing a lot better.   Felt looser after dry needling today.  Soreness will resolve as she moves throughout the day.    She was compliant with home exercise program.  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  Prior Level of Function: no limitations    Pain: 0/10 at rest, 4-5/10 the last couple days with throbbing type pain since Sunday  Location:  L shoulder    Objective      Objective measures taken at reassessment unless otherwise specified.    Treatment     Portia received the treatments listed below:      therapeutic exercises to develop strength, endurance, ROM, flexibility, and posture  "for 00 minutes including:  NP--Supine dowel flexion 2 x 10 w/ towel roll along spine  Supine Internal Rotation/External Rotation at 90 deg abduction for muscle control through full ROM  Supine shoulder horizontal abduction RTB with towel roll b/w scaps 2 x 10  Supine opposite arm flex/ext with RTB x 10 each  NP--Supine thoracic extension stretch with chin tuck on towel roll along spine + snow severino (elbows bent) 2 x 10  Standing median nn glide 2 x 10  NP--Standing radial nn glide (modified spiderman) x 10    Below NP this date:  Supine thoracic extension stretch with chin tuck on towel roll b/w scaps 2 x 10  Supine thoracic extension stretch with chin tuck on towel roll b/w scaps + pec stretch with arms abducted (range as tolerated) 2 x 1 min  Seated L scalene and UT stretch 3x20"  Seated cervical retraction 15 x 5"  Seated bilateral ER YTB 2 x 10  Standing UBE x 3' each direction, 3.0 resistance    manual therapy techniques: Joint mobilizations, Myofacial release, and Soft tissue Mobilization were applied to the: L shoulder for 40 minutes, including:  Posterior and inferior GH mobs grade II-III  NP--Supine myofascial release, trigger point release to Left pec minor, pec major, coracobrachialis, SH biceps, LH biceps, brachialis, middle and anterior deltoid.  Progressed to release at subscapularis, teres, latissimus dorsi, infraspinatus  IASTM and silicone cupping to teres minor and infraspinatus - improved ER afterward  SL scapular mobs and fascial release as needed along lat  (NP) Seated rib area of greatest restriction  (NP) Thoracic Sidebending technique  (NP) Supine 1st rib correction  (NP) Supine CTJ mobilization    Dry needling:  The pt expressed desire to receive trigger point dry needling today. Informed and written consent was obtained including benefits and risks of treatments, copy of signed consent form will be placed in patient's medical record. Verification of pt name, , and site of treatment was " "performed prior to treatment. Pt was positioned in supine to receive trigger point dry needling to Left anterior and middle deltoid with 40 mm serin J type needles. LTR achieved and pt reported familiar pain reproduction. Electro dry needling x 10 minutes.  The pt did not have adverse response to treatment. This was followed by STM to further decrease trigger point activity and pain, patient tolerated well. Post treatment patient demonstrated decreased pain per report following. Pt was educated to drink plenty of water following treatment to help with muscle soreness. Pt was educated to contact therapist post treatment as needed.     neuromuscular re-education activities to improve: Coordination, Kinesthetic, Proprioception, Posture, and Motor Control for 00 minutes. The following activities were included:  Prone scap retract 10 x 5"  Prone scap retract + extension 10 x 5"  Prone T x 10  Prone W x 10  SL shoulder ER with scap stabilization 2 x 10 (towel under arm for comfort)    therapeutic activities to improve functional performance for 00  minutes, including:  May add at later date      Patient Education and Home Exercises       Education provided:   - importance of avoiding onset of pain, tingling, or numbness into the left arm  - activity modification as needed for pain  - importance of compliance to HEP    Written Home Exercises Provided: yes. Exercises were reviewed and Portia was able to demonstrate them prior to the end of the session.  Portia demonstrated good  understanding of the education provided. See Electronic Medical Record under Patient Instructions for exercises provided during therapy sessions    Assessment     Patient with continued tightness along the inside of her Left arm.  Worked on release of pecs, biceps, anterior and middle deltoid muscles.  Good release of tightness with dry needling and manual therapy today. Will benefit from further postural strengthening and stabilization as " laisha Pearce Is progressing well towards her goals.   Patient prognosis is Good.     Patient will continue to benefit from skilled outpatient physical therapy to address the deficits listed in the problem list box on initial evaluation, provide pt/family education and to maximize pt's level of independence in the home and community environment.     Patient's spiritual, cultural and educational needs considered and pt agreeable to plan of care and goals.     Anticipated Barriers for therapy: Schedule conflicts, transportation, pain, guarding, tolerance, endurance, posture, postural awareness, joint and soft tissue deficits    Goals:   Short-Term: 4 weeks (5/3/2024)        partially met  Pt will improve bilateralActive shouder flexion to > or = 155 to promote return to prior functional status.  Pt will improve bilateralActive shouder abduction to > or = 160 to promote return to prior functional status.  Pt will improve leftPassive shouder external rotation to > or = 90 to promote return to prior functional status.  Pt will improve flexibility/mobility of her shoulder, upper arm, pecs, periscapular muscles to help promoted better mobility, posture, alignment, and help return to prior functional status.  Pt will demonstrate improved postural habits as evidenced by moderately improved upright posture with reduced forward head posture.  Pt will decrease pain levels < or = to 3/10 to help promote appropriate progression of PT, HEP, and ADL's    Pt will be compliant with new home exercise program to assist with PT intervention and help allow appropriate progression through plan of care.     Long-Term: 12 weeks (6/28/2024)       ongoing  Pt will improve bilateral shoulder flexion/abduction AROM > or = 165 to allow return to normal activities of daily living.  Pt will improve bilateral shoulder external rotation PROM > or = 95 to allow return to normal activities of daily living.  Pt will improve bilateral shoulder  strength to > or = 5/5 to allow performance of activities of daily living.  Pt will improve trunk and scap-thoracic strength/stabilization to > or = to GOOD to allow better posture, alignment, and help return to prior functional status.  Pt will have > or = to GOOD flexibility/mobility of her shoulder, upper arm, pecs, periscapular muscles to help promoted better mobility, posture, alignment, and help return to prior functional status.  Pt will report < or = 1-2/10 pain during activities of daily living to improve QOL and allow return to prior functional status.   Pt will be compliant and independent with HEP to allow and maintain better participation in normal activities  Pt will be able to resume normals ADL's, IADL's, previous activities, fitness, and work related tasks     Plan   Plan of care Certification: 4/4/2024 to 6/28/2024.     Continue with current POC toward established PT goals. Progress shoulder mobility and postural strength as able.    Matthias Escalera, PT

## 2024-05-29 ENCOUNTER — CLINICAL SUPPORT (OUTPATIENT)
Dept: REHABILITATION | Facility: HOSPITAL | Age: 44
End: 2024-05-29
Payer: COMMERCIAL

## 2024-05-29 DIAGNOSIS — M25.522 UPPER ARM JOINT PAIN, LEFT: ICD-10-CM

## 2024-05-29 DIAGNOSIS — M25.611 SHOULDER JOINT STIFFNESS, BILATERAL: ICD-10-CM

## 2024-05-29 DIAGNOSIS — M25.69 BACK STIFFNESS: ICD-10-CM

## 2024-05-29 DIAGNOSIS — M25.612 SHOULDER JOINT STIFFNESS, BILATERAL: ICD-10-CM

## 2024-05-29 DIAGNOSIS — M25.512 ACUTE PAIN OF LEFT SHOULDER: Primary | ICD-10-CM

## 2024-05-29 PROCEDURE — 97112 NEUROMUSCULAR REEDUCATION: CPT | Mod: PN,CQ

## 2024-05-29 PROCEDURE — 97110 THERAPEUTIC EXERCISES: CPT | Mod: PN,CQ

## 2024-05-29 PROCEDURE — 97140 MANUAL THERAPY 1/> REGIONS: CPT | Mod: PN,CQ

## 2024-05-29 NOTE — PROGRESS NOTES
"[facility]   Physical Therapy Treatment Note      Name: Portia Cruz  Clinic Number: [id number]    Therapy Diagnosis:   Encounter Diagnoses   Name Primary?    Acute pain of left shoulder Yes    Upper arm joint pain, left     Shoulder joint stiffness, bilateral     Back stiffness      Physician: Michael Alfaro MD    Visit Date: 5/29/2024    Physician Orders: PT Eval and Treat   Medical Diagnosis from Referral:  Left upper arm pain [M79.622]   Evaluation Date: 4/4/2024  Authorization Period Expiration: 12/31/2024  Plan of Care Expiration: 6/28/2024  Progress Note Due: 6/7/2024 (Last Progress Note 5/13/2024)  Visit # / Visits authorized: 12 / 20 +eval   FOTO: Issued Visit #: 1/3, (capture on visit 1, 5, 10) first on 4/4/2024     PTA Visit #: 1/5     Precautions: Standard      Time In: 8:20  Time Out: 9:05  Total Appointment Time (timed & untimed codes): 45 minutes      Subjective     Patient reports: has had a "crick" in her right sided neck which has caused some limitations to movement. L shoulder has been feeling pretty good, less frequency of the symptom down the back of the arm.     She was compliant with home exercise program.  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  Prior Level of Function: no limitations    Pain: 0/10 at rest, 2/10 to the R sided neck   Location:  L shoulder    Objective      Objective measures taken at reassessment unless otherwise specified.    Treatment     Portia received the treatments listed below:      therapeutic exercises to develop strength, endurance, ROM, flexibility, and posture for 10 minutes including:  NP--Supine dowel flexion 2 x 10 w/ towel roll along spine  Supine Internal Rotation/External Rotation at 90 deg abduction for muscle control through full ROM  Supine shoulder horizontal abduction RTB with towel roll b/w " "scaps 2 x 10  Supine opposite arm flex/ext with RTB x 10 each  NP--Supine thoracic extension stretch with chin tuck on towel roll along spine + snow severino (elbows bent) 2 x 10  Standing median nn glide 2 x 10  NP--Standing radial nn glide (modified spiderman) x 10    Below NP this date:  Supine thoracic extension stretch with chin tuck on towel roll b/w scaps 2 x 10  Supine thoracic extension stretch with chin tuck on towel roll b/w scaps + pec stretch with arms abducted (range as tolerated) 2 x 1 min  Seated L scalene and UT stretch 3x20"  Seated cervical retraction 15 x 5"  Seated bilateral ER YTB 2 x 10  Standing UBE x 3' each direction, 3.0 resistance    manual therapy techniques: Joint mobilizations, Myofacial release, and Soft tissue Mobilization were applied to the: L shoulder for 25 minutes, including:  Posterior and inferior GH mobs grade II-III  NP--Supine myofascial release, trigger point release to Left pec minor, pec major, coracobrachialis, SH biceps, LH biceps, brachialis, middle and anterior deltoid.  Progressed to release at subscapularis, teres, latissimus dorsi, infraspinatus  (NP) IASTM and silicone cupping to teres minor and infraspinatus - improved ER afterward  SL scapular mobs and fascial release as needed along lat  Prone UPA to C5-T1 for cervical rotation toward right - reported improved mobility after  (NP) Seated rib area of greatest restriction  (NP) Thoracic Sidebending technique  (NP) Supine 1st rib correction  (NP) Supine CTJ mobilization      neuromuscular re-education activities to improve: Coordination, Kinesthetic, Proprioception, Posture, and Motor Control for 10 minutes. The following activities were included:  SL shoulder ER with scap stabilization 2 x 10 (towel under arm for comfort)  Prone scap retract 10 x 5"  Prone scap retract + extension 10 x 5"  NP--Prone T x 10  NP--Prone W x 10    therapeutic activities to improve functional performance for 00  minutes, including:  May " add at later date      Patient Education and Home Exercises       Education provided:   - importance of avoiding onset of pain, tingling, or numbness into the left arm  - activity modification as needed for pain  - importance of compliance to HEP    Written Home Exercises Provided: yes. Exercises were reviewed and Portia was able to demonstrate them prior to the end of the session.  Portia demonstrated good  understanding of the education provided. See Electronic Medical Record under Patient Instructions for exercises provided during therapy sessions    Assessment     Patient presents to clinic with R UT tightness and limitation of rotation toward right which improved with manual techniques. Some mild limitation of the L shoulder at end ranges of overhead motion but is making progress. Able to resume some light strengthening exercises with good tolerance and scapular mechanics. Will benefit from further joint mobilization and postural strengthening and stabilization as able.    Portia Is progressing well towards her goals.   Patient prognosis is Good.     Patient will continue to benefit from skilled outpatient physical therapy to address the deficits listed in the problem list box on initial evaluation, provide pt/family education and to maximize pt's level of independence in the home and community environment.     Patient's spiritual, cultural and educational needs considered and pt agreeable to plan of care and goals.     Anticipated Barriers for therapy: Schedule conflicts, transportation, pain, guarding, tolerance, endurance, posture, postural awareness, joint and soft tissue deficits    Goals:   Short-Term: 4 weeks (5/3/2024)        partially met  Pt will improve bilateralActive shouder flexion to > or = 155 to promote return to prior functional status.  Pt will improve bilateralActive shouder abduction to > or = 160 to promote return to prior functional status.  Pt will improve leftPassive shouder  external rotation to > or = 90 to promote return to prior functional status.  Pt will improve flexibility/mobility of her shoulder, upper arm, pecs, periscapular muscles to help promoted better mobility, posture, alignment, and help return to prior functional status.  Pt will demonstrate improved postural habits as evidenced by moderately improved upright posture with reduced forward head posture.  Pt will decrease pain levels < or = to 3/10 to help promote appropriate progression of PT, HEP, and ADL's    Pt will be compliant with new home exercise program to assist with PT intervention and help allow appropriate progression through plan of care.     Long-Term: 12 weeks (6/28/2024)       ongoing  Pt will improve bilateral shoulder flexion/abduction AROM > or = 165 to allow return to normal activities of daily living.  Pt will improve bilateral shoulder external rotation PROM > or = 95 to allow return to normal activities of daily living.  Pt will improve bilateral shoulder strength to > or = 5/5 to allow performance of activities of daily living.  Pt will improve trunk and scap-thoracic strength/stabilization to > or = to GOOD to allow better posture, alignment, and help return to prior functional status.  Pt will have > or = to GOOD flexibility/mobility of her shoulder, upper arm, pecs, periscapular muscles to help promoted better mobility, posture, alignment, and help return to prior functional status.  Pt will report < or = 1-2/10 pain during activities of daily living to improve QOL and allow return to prior functional status.   Pt will be compliant and independent with HEP to allow and maintain better participation in normal activities  Pt will be able to resume normals ADL's, IADL's, previous activities, fitness, and work related tasks     Plan   Plan of care Certification: 4/4/2024 to 6/28/2024.     Continue with current POC toward established PT goals. Progress shoulder mobility and postural strength as  able.    Tammie Hall, PTA

## 2024-06-03 ENCOUNTER — CLINICAL SUPPORT (OUTPATIENT)
Dept: REHABILITATION | Facility: HOSPITAL | Age: 44
End: 2024-06-03
Payer: COMMERCIAL

## 2024-06-03 DIAGNOSIS — M25.522 UPPER ARM JOINT PAIN, LEFT: ICD-10-CM

## 2024-06-03 DIAGNOSIS — M25.512 ACUTE PAIN OF LEFT SHOULDER: Primary | ICD-10-CM

## 2024-06-03 DIAGNOSIS — M25.611 SHOULDER JOINT STIFFNESS, BILATERAL: ICD-10-CM

## 2024-06-03 DIAGNOSIS — M25.612 SHOULDER JOINT STIFFNESS, BILATERAL: ICD-10-CM

## 2024-06-03 DIAGNOSIS — M25.69 BACK STIFFNESS: ICD-10-CM

## 2024-06-03 PROCEDURE — 97112 NEUROMUSCULAR REEDUCATION: CPT | Mod: PN,CQ

## 2024-06-03 PROCEDURE — 97110 THERAPEUTIC EXERCISES: CPT | Mod: PN,CQ

## 2024-06-03 PROCEDURE — 97140 MANUAL THERAPY 1/> REGIONS: CPT | Mod: PN,CQ

## 2024-06-03 NOTE — PROGRESS NOTES
[facility]   Physical Therapy Treatment Note      Name: Portia Cruz  Clinic Number: [id number]    Therapy Diagnosis:   Encounter Diagnoses   Name Primary?    Acute pain of left shoulder Yes    Upper arm joint pain, left     Shoulder joint stiffness, bilateral     Back stiffness      Physician: Michael Alfaro MD    Visit Date: 6/3/2024    Physician Orders: PT Eval and Treat   Medical Diagnosis from Referral:  Left upper arm pain [M79.622]   Evaluation Date: 4/4/2024  Authorization Period Expiration: 12/31/2024  Plan of Care Expiration: 6/28/2024  Progress Note Due: 6/7/2024 (Last Progress Note 5/13/2024)  Visit # / Visits authorized: 13 / 20 +eval   FOTO: Issued Visit #: 1/3, (capture on visit 1, 5, 10) first on 4/4/2024     PTA Visit #: 1/5     Precautions: Standard      Time In: 9:55  Time Out: 10:40  Total Appointment Time (timed & untimed codes): 45 minutes      Subjective     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 Had difficulty pulling a dress overhead the other night.     She was compliant with home exercise program.  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  Prior Level of Function: no limitations    Pain: 0/10 at rest, 2/10 to the R sided neck   Location:  L shoulder    Objective      Objective measures taken at reassessment unless otherwise specified.    Treatment     Portia received the treatments listed below:      therapeutic exercises to develop strength, endurance, ROM, flexibility, and posture for 10 minutes including:  NP--Supine dowel flexion 2 x 10 w/ towel roll along spine  Supine Internal Rotation/External Rotation at 90 deg abduction for muscle control through full ROM  Supine shoulder horizontal abduction RTB with towel roll b/w scaps 2 x 10  Supine opposite arm  "flex/ext with RTB 2 x 10 each  NP--Supine thoracic extension stretch with chin tuck on towel roll along spine + snow severino (elbows bent) 2 x 10  Standing median nn glide 2 x 10  NP--Standing radial nn glide (modified spiderman) x 10    Below NP this date:  Supine thoracic extension stretch with chin tuck on towel roll b/w scaps 2 x 10  Supine thoracic extension stretch with chin tuck on towel roll b/w scaps + pec stretch with arms abducted (range as tolerated) 2 x 1 min  Seated L scalene and UT stretch 3x20"  Seated cervical retraction 15 x 5"  Seated bilateral ER YTB 2 x 10  Standing UBE x 3' each direction, 3.0 resistance    manual therapy techniques: Joint mobilizations, Myofacial release, and Soft tissue Mobilization were applied to the: L shoulder for 20 minutes, including:  Posterior and inferior GH mobs grade II-III  NP--Supine myofascial release, trigger point release to Left pec minor, pec major, coracobrachialis, SH biceps, LH biceps, brachialis, middle and anterior deltoid.  Progressed to release at subscapularis, teres, latissimus dorsi, infraspinatus  (NP) IASTM and silicone cupping to teres minor and infraspinatus - improved ER afterward  SL scapular mobs and fascial release as needed along lat  Prone UPA to C5-T1 for cervical rotation toward right - reported improved mobility after  (NP) Seated rib area of greatest restriction  (NP) Thoracic Sidebending technique  (NP) Supine 1st rib correction  (NP) Supine CTJ mobilization      neuromuscular re-education activities to improve: Coordination, Kinesthetic, Proprioception, Posture, and Motor Control for 10 minutes. The following activities were included:  SL shoulder ER with scap stabilization 2 x 10 (towel under arm for comfort)  Prone scap retract 10 x 5"  Prone scap retract + extension 10 x 5"  NP--Prone T x 10  Prone W x 10    therapeutic activities to improve functional performance for 00  minutes, including:  May add at later date      Patient " Education and Home Exercises       Education provided:   - importance of avoiding onset of pain, tingling, or numbness into the left arm  - activity modification as needed for pain  - importance of compliance to HEP    Written Home Exercises Provided: yes. Exercises were reviewed and Portia was able to demonstrate them prior to the end of the session.  Portia demonstrated good  understanding of the education provided. See Electronic Medical Record under Patient Instructions for exercises provided during therapy sessions    Assessment     Patient with improving shoulder mobility and reduced tightness overall through the neck and L scapula. Tightness along the infraspinatus that lessened with manual techniques. Good progress of scapular strengthening, challenged with prone W lifts. Will benefit from further joint mobilization and postural strengthening and stabilization as able.    Portia Is progressing well towards her goals.   Patient prognosis is Good.     Patient will continue to benefit from skilled outpatient physical therapy to address the deficits listed in the problem list box on initial evaluation, provide pt/family education and to maximize pt's level of independence in the home and community environment.     Patient's spiritual, cultural and educational needs considered and pt agreeable to plan of care and goals.     Anticipated Barriers for therapy: Schedule conflicts, transportation, pain, guarding, tolerance, endurance, posture, postural awareness, joint and soft tissue deficits    Goals:   Short-Term: 4 weeks (5/3/2024)        partially met  Pt will improve bilateralActive shouder flexion to > or = 155 to promote return to prior functional status.  Pt will improve bilateralActive shouder abduction to > or = 160 to promote return to prior functional status.  Pt will improve leftPassive shouder external rotation to > or = 90 to promote return to prior functional status.  Pt will improve  flexibility/mobility of her shoulder, upper arm, pecs, periscapular muscles to help promoted better mobility, posture, alignment, and help return to prior functional status.  Pt will demonstrate improved postural habits as evidenced by moderately improved upright posture with reduced forward head posture.  Pt will decrease pain levels < or = to 3/10 to help promote appropriate progression of PT, HEP, and ADL's    Pt will be compliant with new home exercise program to assist with PT intervention and help allow appropriate progression through plan of care.     Long-Term: 12 weeks (6/28/2024)       ongoing  Pt will improve bilateral shoulder flexion/abduction AROM > or = 165 to allow return to normal activities of daily living.  Pt will improve bilateral shoulder external rotation PROM > or = 95 to allow return to normal activities of daily living.  Pt will improve bilateral shoulder strength to > or = 5/5 to allow performance of activities of daily living.  Pt will improve trunk and scap-thoracic strength/stabilization to > or = to GOOD to allow better posture, alignment, and help return to prior functional status.  Pt will have > or = to GOOD flexibility/mobility of her shoulder, upper arm, pecs, periscapular muscles to help promoted better mobility, posture, alignment, and help return to prior functional status.  Pt will report < or = 1-2/10 pain during activities of daily living to improve QOL and allow return to prior functional status.   Pt will be compliant and independent with HEP to allow and maintain better participation in normal activities  Pt will be able to resume normals ADL's, IADL's, previous activities, fitness, and work related tasks     Plan   Plan of care Certification: 4/4/2024 to 6/28/2024.     Continue with current POC toward established PT goals. Progress shoulder mobility and postural strength as able.    Tammie Hall, PTA

## 2024-06-05 ENCOUNTER — CLINICAL SUPPORT (OUTPATIENT)
Dept: REHABILITATION | Facility: HOSPITAL | Age: 44
End: 2024-06-05
Payer: COMMERCIAL

## 2024-06-05 DIAGNOSIS — M25.69 BACK STIFFNESS: ICD-10-CM

## 2024-06-05 DIAGNOSIS — M25.512 ACUTE PAIN OF LEFT SHOULDER: Primary | ICD-10-CM

## 2024-06-05 DIAGNOSIS — M25.522 UPPER ARM JOINT PAIN, LEFT: ICD-10-CM

## 2024-06-05 DIAGNOSIS — M25.611 SHOULDER JOINT STIFFNESS, BILATERAL: ICD-10-CM

## 2024-06-05 DIAGNOSIS — M25.612 SHOULDER JOINT STIFFNESS, BILATERAL: ICD-10-CM

## 2024-06-05 PROCEDURE — 97110 THERAPEUTIC EXERCISES: CPT | Mod: PN,CQ

## 2024-06-05 PROCEDURE — 97140 MANUAL THERAPY 1/> REGIONS: CPT | Mod: PN,CQ

## 2024-06-05 PROCEDURE — 97112 NEUROMUSCULAR REEDUCATION: CPT | Mod: PN,CQ

## 2024-06-05 NOTE — PROGRESS NOTES
[facility]   Physical Therapy Treatment Note      Name: Portia Cruz  Clinic Number: [id number]    Therapy Diagnosis:   Encounter Diagnoses   Name Primary?    Acute pain of left shoulder Yes    Upper arm joint pain, left     Shoulder joint stiffness, bilateral     Back stiffness      Physician: Michael Alfaro MD    Visit Date: 6/5/2024    Physician Orders: PT Eval and Treat   Medical Diagnosis from Referral:  Left upper arm pain [M79.622]   Evaluation Date: 4/4/2024  Authorization Period Expiration: 12/31/2024  Plan of Care Expiration: 6/28/2024  Progress Note Due: 6/7/2024 (Last Progress Note 5/13/2024)  Visit # / Visits authorized: 13 / 20 +eval   FOTO: Issued Visit #: 1/3, (capture on visit 1, 5, 10) first on 4/4/2024     PTA Visit #: 1/5     Precautions: Standard      Time In: 9:05  Time Out: 9:50  Total Appointment Time (timed & untimed codes): 45 minutes      Subjective     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 Had difficulty pulling a dress overhead the other night.     She was compliant with home exercise program.  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  Prior Level of Function: no limitations    Pain: 0/10 at rest, 2/10 to the R sided neck   Location:  L shoulder    Objective      Objective measures taken at reassessment unless otherwise specified.    Treatment     Portia received the treatments listed below:      therapeutic exercises to develop strength, endurance, ROM, flexibility, and posture for 10 minutes including:  NP--Supine dowel flexion 2 x 10 w/ towel roll along spine  Supine Internal Rotation/External Rotation at 90 deg abduction for muscle control through full ROM  Supine shoulder horizontal abduction RTB with towel roll b/w scaps 2 x 10  Supine opposite arm  "flex/ext with RTB 2 x 10 each  NP--Supine thoracic extension stretch with chin tuck on towel roll along spine + snow severino (elbows bent) 2 x 10  Standing median nn glide 2 x 10  NP--Standing radial nn glide (modified spiderman) x 10    Below NP this date:  Supine thoracic extension stretch with chin tuck on towel roll b/w scaps 2 x 10  Supine thoracic extension stretch with chin tuck on towel roll b/w scaps + pec stretch with arms abducted (range as tolerated) 2 x 1 min  Seated L scalene and UT stretch 3x20"  Seated cervical retraction 15 x 5"  Seated bilateral ER YTB 2 x 10  Standing UBE x 3' each direction, 3.0 resistance    manual therapy techniques: Joint mobilizations, Myofacial release, and Soft tissue Mobilization were applied to the: L shoulder for 20 minutes, including:  Posterior and inferior GH mobs grade II-III  NP--Supine myofascial release, trigger point release to Left pec minor, pec major, coracobrachialis, SH biceps, LH biceps, brachialis, middle and anterior deltoid.  Progressed to release at subscapularis, teres, latissimus dorsi, infraspinatus  (NP) IASTM and silicone cupping to teres minor and infraspinatus - improved ER afterward  SL scapular mobs and fascial release as needed along lat  Prone UPA to C5-T1 for cervical rotation toward right - reported improved mobility after  (NP) Seated rib area of greatest restriction  (NP) Thoracic Sidebending technique  (NP) Supine 1st rib correction  (NP) Supine CTJ mobilization      neuromuscular re-education activities to improve: Coordination, Kinesthetic, Proprioception, Posture, and Motor Control for 10 minutes. The following activities were included:  SL shoulder ER with scap stabilization 2 x 10 (towel under arm for comfort)  (NP) Prone scap retract 10 x 5"  Prone scap retract + extension 10 x 5"  Prone T x 10  Prone W x 10    therapeutic activities to improve functional performance for 00  minutes, including:  May add at later date      Patient " Education and Home Exercises       Education provided:   - importance of avoiding onset of pain, tingling, or numbness into the left arm  - activity modification as needed for pain  - importance of compliance to HEP    Written Home Exercises Provided: yes. Exercises were reviewed and Portia was able to demonstrate them prior to the end of the session.  Portia demonstrated good  understanding of the education provided. See Electronic Medical Record under Patient Instructions for exercises provided during therapy sessions    Assessment     Patient tolerated treatment session well overall and is making slow progress of tolerance to postural strengthening exercises. No upper extremity nerve paresthesias throughout treatment session and reports less of those daily as well. Does continue with mild stiffness of upper thoracic spine and L scapula but does improve with manual techniques. Will benefit from further joint mobilization and postural strengthening and stabilization as able.    Portia Is progressing well towards her goals.   Patient prognosis is Good.     Patient will continue to benefit from skilled outpatient physical therapy to address the deficits listed in the problem list box on initial evaluation, provide pt/family education and to maximize pt's level of independence in the home and community environment.     Patient's spiritual, cultural and educational needs considered and pt agreeable to plan of care and goals.     Anticipated Barriers for therapy: Schedule conflicts, transportation, pain, guarding, tolerance, endurance, posture, postural awareness, joint and soft tissue deficits    Goals:   Short-Term: 4 weeks (5/3/2024)        partially met  Pt will improve bilateralActive shouder flexion to > or = 155 to promote return to prior functional status.  Pt will improve bilateralActive shouder abduction to > or = 160 to promote return to prior functional status.  Pt will improve leftPassive shouder  external rotation to > or = 90 to promote return to prior functional status.  Pt will improve flexibility/mobility of her shoulder, upper arm, pecs, periscapular muscles to help promoted better mobility, posture, alignment, and help return to prior functional status.  Pt will demonstrate improved postural habits as evidenced by moderately improved upright posture with reduced forward head posture.  Pt will decrease pain levels < or = to 3/10 to help promote appropriate progression of PT, HEP, and ADL's    Pt will be compliant with new home exercise program to assist with PT intervention and help allow appropriate progression through plan of care.     Long-Term: 12 weeks (6/28/2024)       ongoing  Pt will improve bilateral shoulder flexion/abduction AROM > or = 165 to allow return to normal activities of daily living.  Pt will improve bilateral shoulder external rotation PROM > or = 95 to allow return to normal activities of daily living.  Pt will improve bilateral shoulder strength to > or = 5/5 to allow performance of activities of daily living.  Pt will improve trunk and scap-thoracic strength/stabilization to > or = to GOOD to allow better posture, alignment, and help return to prior functional status.  Pt will have > or = to GOOD flexibility/mobility of her shoulder, upper arm, pecs, periscapular muscles to help promoted better mobility, posture, alignment, and help return to prior functional status.  Pt will report < or = 1-2/10 pain during activities of daily living to improve QOL and allow return to prior functional status.   Pt will be compliant and independent with HEP to allow and maintain better participation in normal activities  Pt will be able to resume normals ADL's, IADL's, previous activities, fitness, and work related tasks     Plan   Plan of care Certification: 4/4/2024 to 6/28/2024.     Continue with current POC toward established PT goals. Progress shoulder mobility and postural strength as  able.    Tammie Hall, PTA

## 2024-06-05 NOTE — PROGRESS NOTES
[facility]   Physical Therapy Treatment Note      Name: Portia Cruz  Clinic Number: [id number]    Therapy Diagnosis:   No diagnosis found.    Physician: Michael Alfaro MD    Visit Date: 6/5/2024    Physician Orders: PT Eval and Treat   Medical Diagnosis from Referral:  Left upper arm pain [M79.622]   Evaluation Date: 4/4/2024  Authorization Period Expiration: 12/31/2024  Plan of Care Expiration: 6/28/2024  Progress Note Due: 6/7/2024 (Last Progress Note 5/13/2024)  Visit # / Visits authorized: 13 / 20 +eval   FOTO: Issued Visit #: 1/3, (capture on visit 1, 5, 10) first on 4/4/2024     PTA Visit #: 1/5     Precautions: Standard      Time In: 9:55  Time Out: 10:40  Total Appointment Time (timed & untimed codes): 45 minutes      Subjective     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 Had difficulty pulling a dress overhead the other night.     She was compliant with home exercise program.  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  Prior Level of Function: no limitations    Pain: 0/10 at rest, 2/10 to the R sided neck   Location:  L shoulder    Objective      Objective measures taken at reassessment unless otherwise specified.    Treatment     Portia received the treatments listed below:      therapeutic exercises to develop strength, endurance, ROM, flexibility, and posture for 10 minutes including:  NP--Supine dowel flexion 2 x 10 w/ towel roll along spine  Supine Internal Rotation/External Rotation at 90 deg abduction for muscle control through full ROM  Supine shoulder horizontal abduction RTB with towel roll b/w scaps 2 x 10  Supine opposite arm flex/ext with RTB 2 x 10 each  NP--Supine thoracic extension stretch with chin tuck on towel roll along spine + snow severino (elbows bent) 2 x  "10  Standing median nn glide 2 x 10  NP--Standing radial nn glide (modified spiderman) x 10    Below NP this date:  Supine thoracic extension stretch with chin tuck on towel roll b/w scaps 2 x 10  Supine thoracic extension stretch with chin tuck on towel roll b/w scaps + pec stretch with arms abducted (range as tolerated) 2 x 1 min  Seated L scalene and UT stretch 3x20"  Seated cervical retraction 15 x 5"  Seated bilateral ER YTB 2 x 10  Standing UBE x 3' each direction, 3.0 resistance    manual therapy techniques: Joint mobilizations, Myofacial release, and Soft tissue Mobilization were applied to the: L shoulder for 20 minutes, including:  Posterior and inferior GH mobs grade II-III  NP--Supine myofascial release, trigger point release to Left pec minor, pec major, coracobrachialis, SH biceps, LH biceps, brachialis, middle and anterior deltoid.  Progressed to release at subscapularis, teres, latissimus dorsi, infraspinatus  (NP) IASTM and silicone cupping to teres minor and infraspinatus - improved ER afterward  SL scapular mobs and fascial release as needed along lat  Prone UPA to C5-T1 for cervical rotation toward right - reported improved mobility after  (NP) Seated rib area of greatest restriction  (NP) Thoracic Sidebending technique  (NP) Supine 1st rib correction  (NP) Supine CTJ mobilization      neuromuscular re-education activities to improve: Coordination, Kinesthetic, Proprioception, Posture, and Motor Control for 10 minutes. The following activities were included:  SL shoulder ER with scap stabilization 2 x 10 (towel under arm for comfort)  Prone scap retract 10 x 5"  Prone scap retract + extension 10 x 5"  NP--Prone T x 10  Prone W x 10    therapeutic activities to improve functional performance for 00  minutes, including:  May add at later date      Patient Education and Home Exercises       Education provided:   - importance of avoiding onset of pain, tingling, or numbness into the left arm  - " activity modification as needed for pain  - importance of compliance to HEP    Written Home Exercises Provided: yes. Exercises were reviewed and Portia was able to demonstrate them prior to the end of the session.  Portia demonstrated good  understanding of the education provided. See Electronic Medical Record under Patient Instructions for exercises provided during therapy sessions    Assessment     Patient with improving shoulder mobility and reduced tightness overall through the neck and L scapula. Tightness along the infraspinatus that lessened with manual techniques. Good progress of scapular strengthening, challenged with prone W lifts. Will benefit from further joint mobilization and postural strengthening and stabilization as able.    Portia Is progressing well towards her goals.   Patient prognosis is Good.     Patient will continue to benefit from skilled outpatient physical therapy to address the deficits listed in the problem list box on initial evaluation, provide pt/family education and to maximize pt's level of independence in the home and community environment.     Patient's spiritual, cultural and educational needs considered and pt agreeable to plan of care and goals.     Anticipated Barriers for therapy: Schedule conflicts, transportation, pain, guarding, tolerance, endurance, posture, postural awareness, joint and soft tissue deficits    Goals:   Short-Term: 4 weeks (5/3/2024)        partially met  Pt will improve bilateralActive shouder flexion to > or = 155 to promote return to prior functional status.  Pt will improve bilateralActive shouder abduction to > or = 160 to promote return to prior functional status.  Pt will improve leftPassive shouder external rotation to > or = 90 to promote return to prior functional status.  Pt will improve flexibility/mobility of her shoulder, upper arm, pecs, periscapular muscles to help promoted better mobility, posture, alignment, and help return to  prior functional status.  Pt will demonstrate improved postural habits as evidenced by moderately improved upright posture with reduced forward head posture.  Pt will decrease pain levels < or = to 3/10 to help promote appropriate progression of PT, HEP, and ADL's    Pt will be compliant with new home exercise program to assist with PT intervention and help allow appropriate progression through plan of care.     Long-Term: 12 weeks (6/28/2024)       ongoing  Pt will improve bilateral shoulder flexion/abduction AROM > or = 165 to allow return to normal activities of daily living.  Pt will improve bilateral shoulder external rotation PROM > or = 95 to allow return to normal activities of daily living.  Pt will improve bilateral shoulder strength to > or = 5/5 to allow performance of activities of daily living.  Pt will improve trunk and scap-thoracic strength/stabilization to > or = to GOOD to allow better posture, alignment, and help return to prior functional status.  Pt will have > or = to GOOD flexibility/mobility of her shoulder, upper arm, pecs, periscapular muscles to help promoted better mobility, posture, alignment, and help return to prior functional status.  Pt will report < or = 1-2/10 pain during activities of daily living to improve QOL and allow return to prior functional status.   Pt will be compliant and independent with HEP to allow and maintain better participation in normal activities  Pt will be able to resume normals ADL's, IADL's, previous activities, fitness, and work related tasks     Plan   Plan of care Certification: 4/4/2024 to 6/28/2024.     Continue with current POC toward established PT goals. Progress shoulder mobility and postural strength as able.    Matthias Escalera, PT

## 2024-06-10 NOTE — PROGRESS NOTES
[facility]   Physical Therapy Treatment Note      Name: Portia Cruz  Clinic Number: [id number]    Therapy Diagnosis:   Encounter Diagnoses   Name Primary?    Acute pain of left shoulder Yes    Upper arm joint pain, left     Shoulder joint stiffness, bilateral     Back stiffness      Physician: Michael Alfaro MD    Visit Date: 6/11/2024    Physician Orders: PT Eval and Treat   Medical Diagnosis from Referral:  Left upper arm pain [M79.622]   Evaluation Date: 4/4/2024  Authorization Period Expiration: 12/31/2024  Plan of Care Expiration: 6/28/2024  Progress Note Due: 6/28/2024 (Last Progress Note 6/11/2024, 5/13/2024)  Visit # / Visits authorized: 15 / 20 +eval   FOTO: Issued Visit #: 1/3, (capture on visit 1, 5, 10) first on 4/4/2024     PTA Visit #: 0/5     Precautions: Standard      Time In: 9:00  Time Out: 9:45  Total Appointment Time (timed & untimed codes): 45 minutes      Subjective     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 Had difficulty pulling a dress overhead the other night.     She was compliant with home exercise program.  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  Prior Level of Function: no limitations    Pain: 0/10 at rest, 2/10 to the R sided neck   Location:  L shoulder    Objective      Posture/alignment:  forward head posture, rounded shoulders. (Moderately improved)        Cervical Radiculopathy     Left  Right   Spurling's Test (A-sidebend) negative negative      Distraction Test negative    negative      MNTT Slight was positive    negative      UNTT negative    negative      RNTT negative    negative         Shoulder AROM (PROM):    Left  Right    Flexion  155 was 140 (164 was 152)  150 (163)   Abduction  165 was 155 (173 was 158)  165 (173)   External Rotation      "  (90 was 77)       (90 was 88)   Internal Rotation       (WNL)       (WNL)      Shoulder Strength:    Left  Right    Flexion  5/5 5/5   Scaption 5/5 5/5   Abduction 5/5 5/5   External Rotation 5/5 5/5   Internal Rotation 5/5 5/5                 Palpation:  Increased tone and tenderness to palpation noted Left>Right subscapularis, teres, infraspinatus, latissimus dorsi (much improved here).  Presence of trigger points still noted Left SH biceps, coracobrachialis, anterior deltoid.     Glenohumeral joint:  restrictions with posterior and inferior glides (much improved with manual therapy, decreased guarding following glenohumeral joint mobs)     Scap-thoracic:  tight and guarded, tight Left>Right periscapular muscles.     Special Test Clusters:  + mild/slight c/o impingement with H-K and Neer        Treatment     Portia received the treatments listed below:      therapeutic exercises to develop strength, endurance, ROM, flexibility, and posture for 15 minutes including:  Sleeper stretches and with progression (most limited with Internal Rotation)  NP--Supine dowel flexion 2 x 10 w/ towel roll along spine  Supine Internal Rotation/External Rotation at 90 deg abduction for muscle control through full ROM  Supine shoulder horizontal abduction RTB with towel roll b/w scaps 2 x 10  Supine opposite arm flex/ext with RTB 2 x 10 each  NP--Supine thoracic extension stretch with chin tuck on towel roll along spine + snow severino (elbows bent) 2 x 10  Standing median nn glide 2 x 10  NP--Standing radial nn glide (modified spiderman) x 10    Below NP this date:  Supine thoracic extension stretch with chin tuck on towel roll b/w scaps 2 x 10  Supine thoracic extension stretch with chin tuck on towel roll b/w scaps + pec stretch with arms abducted (range as tolerated) 2 x 1 min  Seated L scalene and UT stretch 3x20"  Seated cervical retraction 15 x 5"  Seated bilateral ER YTB 2 x 10  Standing UBE x 3' each direction, 3.0 " "resistance    manual therapy techniques: Joint mobilizations, Myofacial release, and Soft tissue Mobilization were applied to the: L shoulder for 30 minutes, including:  Posterior and inferior GH mobs grade II-IV (pt supine and prone)  Supine myofascial release, trigger point release to Left pec minor, pec major, coracobrachialis, SH biceps, LH biceps, brachialis, middle and anterior deltoid.  Progressed to release at subscapularis, teres, latissimus dorsi, infraspinatus  (NP) IASTM and silicone cupping to teres minor and infraspinatus - improved ER afterward  SL scapular mobs and fascial release as needed along lat  Prone UPA to C5-T1 for cervical rotation toward right - reported improved mobility after  (NP) Seated rib area of greatest restriction  (NP) Thoracic Sidebending technique  (NP) Supine 1st rib correction  (NP) Supine CTJ mobilization      neuromuscular re-education activities to improve: Coordination, Kinesthetic, Proprioception, Posture, and Motor Control for 00 minutes. The following activities were included:  SL shoulder ER with scap stabilization 2 x 10 (towel under arm for comfort)  (NP) Prone scap retract 10 x 5"  Prone scap retract + extension 10 x 5"  Prone T x 10  Prone W x 10    therapeutic activities to improve functional performance for 00  minutes, including:  May add at later date      Patient Education and Home Exercises       Education provided:   - importance of avoiding onset of pain, tingling, or numbness into the left arm  - activity modification as needed for pain  - importance of compliance to HEP    Written Home Exercises Provided: yes. Exercises were reviewed and Portia was able to demonstrate them prior to the end of the session.  Portia demonstrated good  understanding of the education provided. See Electronic Medical Record under Patient Instructions for exercises provided during therapy sessions    Assessment     Patient making really good progress with PT.  Little c/o " with ADL's.  Good strength but still shows some glenohumeral joint restrictions with guarding.  Good improvements with more aggressive glenohumeral joint mobs today. Will benefit from further joint mobilization and postural strengthening and stabilization as able.    Portia Is progressing well towards her goals.   Patient prognosis is Good.     Patient will continue to benefit from skilled outpatient physical therapy to address the deficits listed in the problem list box on initial evaluation, provide pt/family education and to maximize pt's level of independence in the home and community environment.     Patient's spiritual, cultural and educational needs considered and pt agreeable to plan of care and goals.     Anticipated Barriers for therapy: Schedule conflicts, transportation, pain, guarding, tolerance, endurance, posture, postural awareness, joint and soft tissue deficits    Goals:   Short-Term: 4 weeks (5/3/2024)        met  Pt will improve bilateralActive shouder flexion to > or = 155 to promote return to prior functional status.  Pt will improve bilateralActive shouder abduction to > or = 160 to promote return to prior functional status.  Pt will improve leftPassive shouder external rotation to > or = 90 to promote return to prior functional status.  Pt will improve flexibility/mobility of her shoulder, upper arm, pecs, periscapular muscles to help promoted better mobility, posture, alignment, and help return to prior functional status.  Pt will demonstrate improved postural habits as evidenced by moderately improved upright posture with reduced forward head posture.  Pt will decrease pain levels < or = to 3/10 to help promote appropriate progression of PT, HEP, and ADL's    Pt will be compliant with new home exercise program to assist with PT intervention and help allow appropriate progression through plan of care.     Long-Term: 12 weeks (6/28/2024)       progressing, partially met  Pt will improve  bilateral shoulder flexion/abduction AROM > or = 165 to allow return to normal activities of daily living.  Pt will improve bilateral shoulder external rotation PROM > or = 95 to allow return to normal activities of daily living.  Pt will improve bilateral shoulder strength to > or = 5/5 to allow performance of activities of daily living.  Pt will improve trunk and scap-thoracic strength/stabilization to > or = to GOOD to allow better posture, alignment, and help return to prior functional status.  Pt will have > or = to GOOD flexibility/mobility of her shoulder, upper arm, pecs, periscapular muscles to help promoted better mobility, posture, alignment, and help return to prior functional status.  Pt will report < or = 1-2/10 pain during activities of daily living to improve QOL and allow return to prior functional status.   Pt will be compliant and independent with HEP to allow and maintain better participation in normal activities  Pt will be able to resume normals ADL's, IADL's, previous activities, fitness, and work related tasks     Plan   Plan of care Certification: 4/4/2024 to 6/28/2024.     Continue with current POC toward established PT goals. Progress shoulder mobility and postural strength as able.    Matthias Escalera, PT

## 2024-06-11 ENCOUNTER — CLINICAL SUPPORT (OUTPATIENT)
Dept: REHABILITATION | Facility: HOSPITAL | Age: 44
End: 2024-06-11
Payer: COMMERCIAL

## 2024-06-11 DIAGNOSIS — M25.522 UPPER ARM JOINT PAIN, LEFT: ICD-10-CM

## 2024-06-11 DIAGNOSIS — M25.512 ACUTE PAIN OF LEFT SHOULDER: Primary | ICD-10-CM

## 2024-06-11 DIAGNOSIS — M25.69 BACK STIFFNESS: ICD-10-CM

## 2024-06-11 DIAGNOSIS — M25.611 SHOULDER JOINT STIFFNESS, BILATERAL: ICD-10-CM

## 2024-06-11 DIAGNOSIS — M25.612 SHOULDER JOINT STIFFNESS, BILATERAL: ICD-10-CM

## 2024-06-11 PROCEDURE — 97140 MANUAL THERAPY 1/> REGIONS: CPT | Mod: PN

## 2024-06-11 PROCEDURE — 97110 THERAPEUTIC EXERCISES: CPT | Mod: PN

## 2024-06-12 NOTE — PROGRESS NOTES
[facility]   Physical Therapy Treatment Note      Name: Portia Cruz  Clinic Number: [id number]    Therapy Diagnosis:   Encounter Diagnoses   Name Primary?    Acute pain of left shoulder Yes    Upper arm joint pain, left     Shoulder joint stiffness, bilateral     Back stiffness      Physician: Michael Alfaro MD    Visit Date: 6/13/2024    Physician Orders: PT Eval and Treat   Medical Diagnosis from Referral:  Left upper arm pain [M79.622]   Evaluation Date: 4/4/2024  Authorization Period Expiration: 12/31/2024  Plan of Care Expiration: 6/28/2024  Progress Note Due: 6/28/2024 (Last Progress Note 6/11/2024, 5/13/2024)  Visit # / Visits authorized: 16 / 20 +eval   FOTO: Issued Visit #: 1/3, (capture on visit 1, 5, 10) first on 4/4/2024     PTA Visit #: 0/5     Precautions: Standard      Time In: 9:00  Time Out: 9:45  Total Appointment Time (timed & untimed codes): 45 minutes      Subjective     Patient reports: she has been feeling a lot better.  A little tightness but responded well to glenohumeral joint mobs, stretching and trigger point release to shoulder and upper arm muscles.  Dr follow up next week and she will see how it goes.  She is close to discharge to home exercise program but will see how she feels in a week.  Ok to return for a few visits if needed.     She was compliant with home exercise program.  Response to previous treatment: sore, but improved pain and ROM  Current Level of Function: back to doing most of her normal ADL's again  Prior Level of Function: no limitations    Pain: 0/10 at rest, 2/10 in some positions  Location:  L shoulder    Objective    Last Progress Note 6/11/2024       Treatment     Portia received the treatments listed below:      therapeutic exercises to develop strength, endurance, ROM, flexibility, and posture for 15 minutes including:  Sleeper stretches and with progression (most limited with Internal Rotation)  NP--Supine dowel flexion 2  "x 10 w/ towel roll along spine  Supine Internal Rotation/External Rotation at 90 deg abduction for muscle control through full ROM  Supine shoulder horizontal abduction RTB with towel roll b/w scaps 2 x 10  Supine opposite arm flex/ext with RTB 2 x 10 each  NP--Supine thoracic extension stretch with chin tuck on towel roll along spine + snow severino (elbows bent) 2 x 10  Standing median nn glide 2 x 10  NP--Standing radial nn glide (modified spiderman) x 10    Below NP this date:  Supine thoracic extension stretch with chin tuck on towel roll b/w scaps 2 x 10  Supine thoracic extension stretch with chin tuck on towel roll b/w scaps + pec stretch with arms abducted (range as tolerated) 2 x 1 min  Seated L scalene and UT stretch 3x20"  Seated cervical retraction 15 x 5"  Seated bilateral ER YTB 2 x 10  Standing UBE x 3' each direction, 3.0 resistance    manual therapy techniques: Joint mobilizations, Myofacial release, and Soft tissue Mobilization were applied to the: L shoulder for 30 minutes, including:  Posterior and inferior GH mobs grade II-IV (pt supine and prone)  Supine myofascial release, trigger point release to Left pec minor, pec major, coracobrachialis, SH biceps, LH biceps, brachialis, middle and anterior deltoid.  Progressed to release at subscapularis, teres, latissimus dorsi, infraspinatus  (NP) IASTM and silicone cupping to teres minor and infraspinatus - improved ER afterward  SL scapular mobs and fascial release as needed along lat  Prone UPA to C5-T1 for cervical rotation toward right - reported improved mobility after  (NP) Seated rib area of greatest restriction  (NP) Thoracic Sidebending technique  (NP) Supine 1st rib correction  (NP) Supine CTJ mobilization      neuromuscular re-education activities to improve: Coordination, Kinesthetic, Proprioception, Posture, and Motor Control for 00 minutes. The following activities were included:  SL shoulder ER with scap stabilization 2 x 10 (towel under " "arm for comfort)  (NP) Prone scap retract 10 x 5"  Prone scap retract + extension 10 x 5"  Prone T x 10  Prone W x 10    therapeutic activities to improve functional performance for 00  minutes, including:  May add at later date      Patient Education and Home Exercises       Education provided:   - importance of avoiding onset of pain, tingling, or numbness into the left arm  - activity modification as needed for pain  - importance of compliance to HEP    Written Home Exercises Provided: yes. Exercises were reviewed and Portia was able to demonstrate them prior to the end of the session.  Portia demonstrated good  understanding of the education provided. See Electronic Medical Record under Patient Instructions for exercises provided during therapy sessions    Assessment     Patient making really good progress with PT.  Little to no c/o with ADL's.  Good strength but still shows some mild/slight glenohumeral joint restrictions with guarding.  Good improvements with more aggressive glenohumeral joint mobs again today. Has Dr follow up in a week and will see if she will discharge to home exercise program vs continue for a few more visits.    Portia Is progressing well towards her goals.   Patient prognosis is Good.     Patient will continue to benefit from skilled outpatient physical therapy to address the deficits listed in the problem list box on initial evaluation, provide pt/family education and to maximize pt's level of independence in the home and community environment.     Patient's spiritual, cultural and educational needs considered and pt agreeable to plan of care and goals.     Anticipated Barriers for therapy: Schedule conflicts, transportation, pain, guarding, tolerance, endurance, posture, postural awareness, joint and soft tissue deficits    Goals:   Short-Term: 4 weeks (5/3/2024)        met  Pt will improve bilateralActive shouder flexion to > or = 155 to promote return to prior functional " status.  Pt will improve bilateralActive shouder abduction to > or = 160 to promote return to prior functional status.  Pt will improve leftPassive shouder external rotation to > or = 90 to promote return to prior functional status.  Pt will improve flexibility/mobility of her shoulder, upper arm, pecs, periscapular muscles to help promoted better mobility, posture, alignment, and help return to prior functional status.  Pt will demonstrate improved postural habits as evidenced by moderately improved upright posture with reduced forward head posture.  Pt will decrease pain levels < or = to 3/10 to help promote appropriate progression of PT, HEP, and ADL's    Pt will be compliant with new home exercise program to assist with PT intervention and help allow appropriate progression through plan of care.     Long-Term: 12 weeks (6/28/2024)       progressing, partially met  Pt will improve bilateral shoulder flexion/abduction AROM > or = 165 to allow return to normal activities of daily living.  Pt will improve bilateral shoulder external rotation PROM > or = 95 to allow return to normal activities of daily living.  Pt will improve bilateral shoulder strength to > or = 5/5 to allow performance of activities of daily living.  Pt will improve trunk and scap-thoracic strength/stabilization to > or = to GOOD to allow better posture, alignment, and help return to prior functional status.  Pt will have > or = to GOOD flexibility/mobility of her shoulder, upper arm, pecs, periscapular muscles to help promoted better mobility, posture, alignment, and help return to prior functional status.  Pt will report < or = 1-2/10 pain during activities of daily living to improve QOL and allow return to prior functional status.   Pt will be compliant and independent with HEP to allow and maintain better participation in normal activities  Pt will be able to resume normals ADL's, IADL's, previous activities, fitness, and work related tasks      Plan   Plan of care Certification: 4/4/2024 to 6/28/2024.     Continue with current POC toward established PT goals. Progress shoulder mobility and postural strength as able.    Matthias Escalera, PT

## 2024-06-13 ENCOUNTER — CLINICAL SUPPORT (OUTPATIENT)
Dept: REHABILITATION | Facility: HOSPITAL | Age: 44
End: 2024-06-13
Payer: COMMERCIAL

## 2024-06-13 DIAGNOSIS — M25.512 ACUTE PAIN OF LEFT SHOULDER: Primary | ICD-10-CM

## 2024-06-13 DIAGNOSIS — M25.69 BACK STIFFNESS: ICD-10-CM

## 2024-06-13 DIAGNOSIS — M25.612 SHOULDER JOINT STIFFNESS, BILATERAL: ICD-10-CM

## 2024-06-13 DIAGNOSIS — M25.611 SHOULDER JOINT STIFFNESS, BILATERAL: ICD-10-CM

## 2024-06-13 DIAGNOSIS — M25.522 UPPER ARM JOINT PAIN, LEFT: ICD-10-CM

## 2024-06-13 PROCEDURE — 97110 THERAPEUTIC EXERCISES: CPT | Mod: PN

## 2024-06-13 PROCEDURE — 97140 MANUAL THERAPY 1/> REGIONS: CPT | Mod: PN

## 2024-09-24 ENCOUNTER — HOSPITAL ENCOUNTER (OUTPATIENT)
Dept: RADIOLOGY | Facility: HOSPITAL | Age: 44
Discharge: HOME | End: 2024-09-24
Attending: FAMILY MEDICINE
Payer: COMMERCIAL

## 2024-09-24 DIAGNOSIS — Z12.31 ENCOUNTER FOR SCREENING MAMMOGRAM FOR MALIGNANT NEOPLASM OF BREAST: ICD-10-CM

## 2024-09-24 PROCEDURE — 77063 BREAST TOMOSYNTHESIS BI: CPT | Mod: 26,,, | Performed by: RADIOLOGY

## 2024-09-24 PROCEDURE — 77063 BREAST TOMOSYNTHESIS BI: CPT | Mod: TC,PO

## 2024-09-24 PROCEDURE — 77067 SCR MAMMO BI INCL CAD: CPT | Mod: 26,,, | Performed by: RADIOLOGY

## 2024-09-24 PROCEDURE — 77067 SCR MAMMO BI INCL CAD: CPT | Mod: TC,PO

## 2024-12-14 DIAGNOSIS — F41.9 ANXIETY: ICD-10-CM

## 2024-12-16 RX ORDER — CITALOPRAM 40 MG/1
40 TABLET, FILM COATED ORAL
Qty: 90 TABLET | Refills: 3 | Status: SHIPPED | OUTPATIENT
Start: 2024-12-16

## 2025-01-13 ENCOUNTER — OFFICE VISIT (OUTPATIENT)
Dept: FAMILY MEDICINE | Facility: CLINIC | Age: 45
End: 2025-01-13
Payer: COMMERCIAL

## 2025-01-13 VITALS
SYSTOLIC BLOOD PRESSURE: 130 MMHG | WEIGHT: 135.69 LBS | BODY MASS INDEX: 23.17 KG/M2 | OXYGEN SATURATION: 98 % | HEART RATE: 92 BPM | HEIGHT: 64 IN | DIASTOLIC BLOOD PRESSURE: 81 MMHG

## 2025-01-13 DIAGNOSIS — Z00.00 WELL ADULT EXAM: Primary | ICD-10-CM

## 2025-01-13 DIAGNOSIS — N32.81 OAB (OVERACTIVE BLADDER): ICD-10-CM

## 2025-01-13 DIAGNOSIS — K21.9 GASTROESOPHAGEAL REFLUX DISEASE, UNSPECIFIED WHETHER ESOPHAGITIS PRESENT: ICD-10-CM

## 2025-01-13 PROCEDURE — 99999 PR PBB SHADOW E&M-EST. PATIENT-LVL III: CPT | Mod: PBBFAC,,, | Performed by: FAMILY MEDICINE

## 2025-01-13 PROCEDURE — 99396 PREV VISIT EST AGE 40-64: CPT | Mod: S$GLB,,, | Performed by: FAMILY MEDICINE

## 2025-01-13 PROCEDURE — 3079F DIAST BP 80-89 MM HG: CPT | Mod: CPTII,S$GLB,, | Performed by: FAMILY MEDICINE

## 2025-01-13 PROCEDURE — 3008F BODY MASS INDEX DOCD: CPT | Mod: CPTII,S$GLB,, | Performed by: FAMILY MEDICINE

## 2025-01-13 PROCEDURE — 3075F SYST BP GE 130 - 139MM HG: CPT | Mod: CPTII,S$GLB,, | Performed by: FAMILY MEDICINE

## 2025-01-13 RX ORDER — OMEPRAZOLE 40 MG/1
40 CAPSULE, DELAYED RELEASE ORAL DAILY
Qty: 90 CAPSULE | Refills: 3 | Status: SHIPPED | OUTPATIENT
Start: 2025-01-13 | End: 2026-01-13

## 2025-01-13 RX ORDER — OXYBUTYNIN CHLORIDE 5 MG/1
5 TABLET, EXTENDED RELEASE ORAL DAILY
Qty: 90 TABLET | Refills: 3 | Status: SHIPPED | OUTPATIENT
Start: 2025-01-13 | End: 2026-01-13

## 2025-01-13 NOTE — PROGRESS NOTES
SUBJECTIVE:    Patient ID: Portia Cruz is a 44 y.o. female.    Chief Complaint: Annual Exam  45 yo female here today for an annual exam I reviewed patient's overdue health maintenance she is currently due for hepatitis-C screening, HIV screening, COVID-19 vaccine.  , 2 children (boys girl) .     Discussed incorporating healthy habits into your daily routine to support your overall well-being. Aim to get at least 8 hours of sleep each night, as adequate rest is crucial for your body to repair and rejuvenate. Additionally, consuming 5 servings of fruits and vegetables each day will provide essential nutrients and antioxidants that help maintain your health. Staying hydrated by drinking 64 ounces of water daily is vital for optimal body function and can aid in digestion and energy levels. Lastly, strive for at least 150 minutes of moderate exercise each week, which can improve cardiovascular health, boost mood, and enhance your quality of life.           SPMHx  Anxiety: Celexa 40mg  Allergic Rhinitis: Flonase, Claritin 10mg      Specialists  Derm: Dr Andersen  GYN: Dr Darling      Smoke: None  ETOH: 1 a month  Exercise: None      Gastroesophageal Reflux  She complains of globus sensation and heartburn. She reports no chest pain, no coughing or no wheezing. This is a recurrent problem. The current episode started more than 1 year ago. The problem occurs occasionally. The problem has been unchanged. Pertinent negatives include no fatigue. She has tried a histamine-2 antagonist for the symptoms. The treatment provided moderate relief.         Past Medical History:   Diagnosis Date    Dysthymic disorder     Postpartum depression      Social History     Socioeconomic History    Marital status:     Number of children: 2   Occupational History     Employer: [email or web address]   Tobacco Use    Smoking status: Never    Smokeless tobacco: Never   Substance and Sexual Activity    Alcohol use: Yes      Alcohol/week: 0.0 standard drinks of alcohol     Comment: once a month    Drug use: No    Sexual activity: Yes     Partners: Male     Birth control/protection: Pill     Comment: No hx of STDs   Social History Narrative    The patient does not exercise regularly ().    Rates diet as good.    She is satisfied with weight.                 Social Drivers of Health     Financial Resource Strain: Low Risk  (1/8/2024)    Overall Financial Resource Strain (CARDIA)     Difficulty of Paying Living Expenses: Not very hard   Food Insecurity: No Food Insecurity (1/8/2024)    Hunger Vital Sign     Worried About Running Out of Food in the Last Year: Never true     Ran Out of Food in the Last Year: Never true   Transportation Needs: No Transportation Needs (1/8/2024)    PRAPARE - Transportation     Lack of Transportation (Medical): No     Lack of Transportation (Non-Medical): No   Physical Activity: Insufficiently Active (1/8/2024)    Exercise Vital Sign     Days of Exercise per Week: 2 days     Minutes of Exercise per Session: 20 min   Stress: No Stress Concern Present (1/8/2024)    [employer] - Occupational Stress Questionnaire     Feeling of Stress : Not at all   Housing Stability: Low Risk  (1/8/2024)    Housing Stability Vital Sign     Unable to Pay for Housing in the Last Year: No     Number of Places Lived in the Last Year: 1     Unstable Housing in the Last Year: No     Past Surgical History:   Procedure Laterality Date    PATELLA REALIGNMENT  6/2013    Dr. Dueñas     Family History   Problem Relation Name Age of Onset    Diabetes Mother      Cancer Father          lymphoma     Current Outpatient Medications   Medication Sig Dispense Refill    cetirizine (ZYRTEC) 10 MG tablet Take 10 mg by mouth once daily.      citalopram (CELEXA) 40 MG tablet TAKE 1 TABLET BY MOUTH EVERY DAY 90 tablet 3    MULTIVITAMIN ORAL Take 1 tablet by mouth once daily.      omeprazole (PRILOSEC) 40 MG capsule Take 1  "capsule (40 mg total) by mouth once daily. 90 capsule 3    oxybutynin (DITROPAN-XL) 5 MG TR24 Take 1 tablet (5 mg total) by mouth once daily. 90 tablet 3     No current facility-administered medications for this visit.     Review of patient's allergies indicates:   Allergen Reactions    Ativan [lorazepam]        Review of Systems   Constitutional:  Negative for activity change, diaphoresis, fatigue and unexpected weight change.   HENT:  Negative for congestion, hearing loss, rhinorrhea, sinus pressure, sinus pain and trouble swallowing.    Eyes:  Negative for discharge and visual disturbance.   Respiratory:  Negative for cough, chest tightness, shortness of breath and wheezing.    Cardiovascular:  Negative for chest pain and palpitations.   Gastrointestinal:  Positive for heartburn. Negative for blood in stool, constipation, diarrhea and vomiting.   Endocrine: Negative for polydipsia and polyuria.   Genitourinary:  Positive for urgency. Negative for difficulty urinating, dysuria, flank pain, hematuria and menstrual problem.   Musculoskeletal:  Negative for arthralgias, joint swelling and neck pain.   Neurological:  Negative for dizziness, weakness and headaches.   Psychiatric/Behavioral:  Negative for confusion, dysphoric mood and suicidal ideas. The patient is not nervous/anxious.           Blood pressure 130/81, pulse 92, height 5' 4" (1.626 m), weight 61.6 kg (135 lb 11.2 oz), SpO2 98%. Body mass index is 23.29 kg/m².   Objective:      Physical Exam  Vitals reviewed.   Constitutional:       General: She is not in acute distress.     Appearance: Normal appearance. She is not ill-appearing or toxic-appearing.   HENT:      Head: Normocephalic and atraumatic.      Right Ear: Tympanic membrane and external ear normal. There is no impacted cerumen.      Left Ear: There is no impacted cerumen.      Nose: Nose normal. No congestion or rhinorrhea.      Mouth/Throat:      Mouth: Mucous membranes are moist.      Pharynx: " Oropharynx is clear. No oropharyngeal exudate or posterior oropharyngeal erythema.   Eyes:      Pupils: Pupils are equal, round, and reactive to light.   Cardiovascular:      Rate and Rhythm: Normal rate and regular rhythm.      Heart sounds: Normal heart sounds. No murmur heard.  Pulmonary:      Effort: Pulmonary effort is normal. No respiratory distress.      Breath sounds: Normal breath sounds. No wheezing or rhonchi.   Abdominal:      General: Abdomen is flat. There is no distension.      Palpations: Abdomen is soft.      Tenderness: There is no abdominal tenderness. There is no guarding.      Hernia: No hernia is present.   Skin:     General: Skin is warm and dry.      Capillary Refill: Capillary refill takes less than 2 seconds.   Neurological:      Mental Status: She is alert and oriented to person, place, and time.         Assessment:       1. Well adult exam    2. OAB (overactive bladder)    3. Gastroesophageal reflux disease, unspecified whether esophagitis present         Plan:           Well adult exam  -     LIPID PANEL; Future; Expected date: 01/13/2025  -     COMPREHENSIVE METABOLIC PANEL; Future; Expected date: 01/13/2025  -     CBC W/ AUTO DIFFERENTIAL; Future; Expected date: 01/13/2025  Patient counseled that maintaining good health and a healthy lifestyle requires a combination of regular physical activity, a balanced diet, and routine health screenings. Engaging in exercises such as walking, cycling, or swimming helps to strengthen the heart and improve mental wellbeing. Consuming a diet rich in fruits, vegetables, whole grains, and lean proteins provides essential nutrients and supports overall health. Additionally, routine health screenings and check-ups are crucial for early detection and prevention of potential health issues, ensuring that you stay informed and proactive about your health status.     OAB (overactive bladder)  -     oxybutynin (DITROPAN-XL) 5 MG TR24; Take 1 tablet (5 mg  total) by mouth once daily.  Dispense: 90 tablet; Refill: 3  Well controlled continue on current dose of oxybutynin    Gastroesophageal reflux disease, unspecified whether esophagitis present  -     omeprazole (PRILOSEC) 40 MG capsule; Take 1 capsule (40 mg total) by mouth once daily.  Dispense: 90 capsule; Refill: 3      To effectively manage your GERD symptoms, take the prescribed proton pump inhibitor (PPI) once daily, ideally 30 minutes before your first meal of the day. In addition to medication, make dietary changes by avoiding trigger foods such as spicy, fatty, or acidic foods, and try to eat smaller, more frequent meals. If you have any questions or experience side effects, please contact our office for further assistance.

## 2025-01-27 ENCOUNTER — LAB VISIT (OUTPATIENT)
Dept: LAB | Facility: HOSPITAL | Age: 45
End: 2025-01-27
Attending: FAMILY MEDICINE
Payer: COMMERCIAL

## 2025-01-27 DIAGNOSIS — D64.9 ANEMIA, UNSPECIFIED TYPE: Primary | ICD-10-CM

## 2025-01-27 DIAGNOSIS — Z00.00 WELL ADULT EXAM: ICD-10-CM

## 2025-01-27 LAB
ALBUMIN SERPL BCP-MCNC: 4 G/DL (ref 3.5–5.2)
ALP SERPL-CCNC: 30 U/L (ref 55–135)
ALT SERPL W/O P-5'-P-CCNC: 7 U/L (ref 10–44)
ANION GAP SERPL CALC-SCNC: 6 MMOL/L (ref 8–16)
AST SERPL-CCNC: 13 U/L (ref 10–40)
BASOPHILS # BLD AUTO: 0.04 K/UL (ref 0–0.2)
BASOPHILS NFR BLD: 0.7 % (ref 0–1.9)
BILIRUB SERPL-MCNC: 0.3 MG/DL (ref 0.1–1)
BUN SERPL-MCNC: 19 MG/DL (ref 6–20)
CALCIUM SERPL-MCNC: 8.6 MG/DL (ref 8.7–10.5)
CHLORIDE SERPL-SCNC: 106 MMOL/L (ref 95–110)
CHOLEST SERPL-MCNC: 218 MG/DL (ref 120–199)
CHOLEST/HDLC SERPL: 4 {RATIO} (ref 2–5)
CO2 SERPL-SCNC: 26 MMOL/L (ref 23–29)
CREAT SERPL-MCNC: 0.6 MG/DL (ref 0.5–1.4)
DIFFERENTIAL METHOD BLD: ABNORMAL
EOSINOPHIL # BLD AUTO: 0.4 K/UL (ref 0–0.5)
EOSINOPHIL NFR BLD: 6.4 % (ref 0–8)
ERYTHROCYTE [DISTWIDTH] IN BLOOD BY AUTOMATED COUNT: 12.2 % (ref 11.5–14.5)
EST. GFR  (NO RACE VARIABLE): >60 ML/MIN/1.73 M^2
GLUCOSE SERPL-MCNC: 85 MG/DL (ref 70–110)
HCT VFR BLD AUTO: 35.6 % (ref 37–48.5)
HDLC SERPL-MCNC: 54 MG/DL (ref 40–75)
HDLC SERPL: 24.8 % (ref 20–50)
HGB BLD-MCNC: 11.6 G/DL (ref 12–16)
IMM GRANULOCYTES # BLD AUTO: 0.01 K/UL (ref 0–0.04)
IMM GRANULOCYTES NFR BLD AUTO: 0.2 % (ref 0–0.5)
LDLC SERPL CALC-MCNC: 148.2 MG/DL (ref 63–159)
LYMPHOCYTES # BLD AUTO: 2 K/UL (ref 1–4.8)
LYMPHOCYTES NFR BLD: 33.9 % (ref 18–48)
MCH RBC QN AUTO: 30.1 PG (ref 27–31)
MCHC RBC AUTO-ENTMCNC: 32.6 G/DL (ref 32–36)
MCV RBC AUTO: 93 FL (ref 82–98)
MONOCYTES # BLD AUTO: 0.5 K/UL (ref 0.3–1)
MONOCYTES NFR BLD: 9.2 % (ref 4–15)
NEUTROPHILS # BLD AUTO: 2.9 K/UL (ref 1.8–7.7)
NEUTROPHILS NFR BLD: 49.6 % (ref 38–73)
NONHDLC SERPL-MCNC: 164 MG/DL
NRBC BLD-RTO: 0 /100 WBC
PLATELET # BLD AUTO: 239 K/UL (ref 150–450)
PMV BLD AUTO: 10.8 FL (ref 9.2–12.9)
POTASSIUM SERPL-SCNC: 3.9 MMOL/L (ref 3.5–5.1)
PROT SERPL-MCNC: 6.9 G/DL (ref 6–8.4)
RBC # BLD AUTO: 3.85 M/UL (ref 4–5.4)
SODIUM SERPL-SCNC: 138 MMOL/L (ref 136–145)
TRIGL SERPL-MCNC: 79 MG/DL (ref 30–150)
WBC # BLD AUTO: 5.76 K/UL (ref 3.9–12.7)

## 2025-01-27 PROCEDURE — 80061 LIPID PANEL: CPT | Performed by: FAMILY MEDICINE

## 2025-01-27 PROCEDURE — 36415 COLL VENOUS BLD VENIPUNCTURE: CPT | Performed by: FAMILY MEDICINE

## 2025-01-27 PROCEDURE — 85025 COMPLETE CBC W/AUTO DIFF WBC: CPT | Performed by: FAMILY MEDICINE

## 2025-01-27 PROCEDURE — 80053 COMPREHEN METABOLIC PANEL: CPT | Performed by: FAMILY MEDICINE

## 2025-07-18 ENCOUNTER — HOSPITAL ENCOUNTER (OUTPATIENT)
Dept: RADIOLOGY | Facility: HOSPITAL | Age: 45
Discharge: HOME | End: 2025-07-18
Attending: FAMILY MEDICINE
Payer: COMMERCIAL

## 2025-07-18 ENCOUNTER — OFFICE VISIT (OUTPATIENT)
Dept: FAMILY MEDICINE | Facility: CLINIC | Age: 45
End: 2025-07-18
Payer: COMMERCIAL

## 2025-07-18 VITALS
OXYGEN SATURATION: 98 % | WEIGHT: 132.94 LBS | DIASTOLIC BLOOD PRESSURE: 86 MMHG | SYSTOLIC BLOOD PRESSURE: 119 MMHG | HEIGHT: 64 IN | HEART RATE: 101 BPM | BODY MASS INDEX: 22.7 KG/M2

## 2025-07-18 DIAGNOSIS — M79.644 PAIN OF RIGHT THUMB: Primary | ICD-10-CM

## 2025-07-18 DIAGNOSIS — M79.644 PAIN OF RIGHT THUMB: ICD-10-CM

## 2025-07-18 PROCEDURE — 73130 X-RAY EXAM OF HAND: CPT | Mod: 26,RT,, | Performed by: RADIOLOGY

## 2025-07-18 PROCEDURE — 73130 X-RAY EXAM OF HAND: CPT | Mod: TC,PO,RT

## 2025-07-18 PROCEDURE — 99999 PR PBB SHADOW E&M-EST. PATIENT-LVL III: CPT | Mod: PBBFAC,,, | Performed by: FAMILY MEDICINE

## 2025-07-18 RX ORDER — MELOXICAM 15 MG/1
15 TABLET ORAL DAILY
Qty: 90 TABLET | Refills: 1 | Status: SHIPPED | OUTPATIENT
Start: 2025-07-18

## 2025-07-18 NOTE — PROGRESS NOTES
"  SUBJECTIVE:    Patient ID: Portia Cruz is a 44 y.o. female.    Chief Complaint: Hand Pain (Right Thumb)  43 yo female here today complaining of pain in the DIP joint of her right thumb, pt states that it is not currently painfull, she is a  and it was hurting during the school year, pain with gripping.        SPMHx  Anxiety: Celexa 40mg  Allergic Rhinitis: Flonase, Claritin 10mg      Specialists  Derm: Dr Andersen  GYN: Dr Darling      Smoke: None  ETOH: 1 a month  Exercise: None      HPI      Past Medical History:   Diagnosis Date    Dysthymic disorder     Postpartum depression      Social History[1]  Past Surgical History:   Procedure Laterality Date    PATELLA REALIGNMENT  6/2013    Dr. Dueñas     Family History   Problem Relation Name Age of Onset    Diabetes Mother      Cancer Father          lymphoma     Current Medications[2]  Review of patient's allergies indicates:   Allergen Reactions    Ativan [lorazepam]        Review of Systems   Constitutional:  Negative for activity change, appetite change, diaphoresis, fatigue and unexpected weight change.   Respiratory:  Negative for cough, chest tightness, shortness of breath and wheezing.    Cardiovascular:  Negative for chest pain and palpitations.   Musculoskeletal:  Positive for arthralgias (right dip joint in the thumb).          Blood pressure 119/86, pulse 101, height 5' 4" (1.626 m), weight 60.3 kg (132 lb 15 oz), SpO2 98%. Body mass index is 22.82 kg/m².   Objective:      Physical Exam  Vitals reviewed.   Constitutional:       Appearance: Normal appearance. She is normal weight.   Musculoskeletal:      Right hand: Normal.      Left hand: Normal.   Neurological:      Mental Status: She is alert.         Assessment:       1. Pain of right thumb         Plan:           Pain of right thumb  -     X-Ray Hand Complete Right; Future; Expected date: 07/18/2025  -     meloxicam (MOBIC) 15 MG tablet; Take 1 tablet (15 mg total) by mouth once " daily.  Dispense: 90 tablet; Refill: 1                                 [1]   Social History  Socioeconomic History    Marital status:     Number of children: 2   Occupational History     Employer: [employer]   Tobacco Use    Smoking status: Never    Smokeless tobacco: Never   Substance and Sexual Activity    Alcohol use: Yes     Alcohol/week: 0.0 standard drinks of alcohol     Comment: once a month    Drug use: No    Sexual activity: Yes     Partners: Male     Birth control/protection: Pill     Comment: No hx of STDs   Social History Narrative    The patient does not exercise regularly ().    Rates diet as good.    She is satisfied with weight.                 Social Drivers of Health     Financial Resource Strain: Low Risk  (1/8/2024)    Overall Financial Resource Strain (CARDIA)     Difficulty of Paying Living Expenses: Not very hard   Food Insecurity: No Food Insecurity (1/8/2024)    Hunger Vital Sign     Worried About Running Out of Food in the Last Year: Never true     Ran Out of Food in the Last Year: Never true   Transportation Needs: No Transportation Needs (1/8/2024)    PRAPARE - Transportation     Lack of Transportation (Medical): No     Lack of Transportation (Non-Medical): No   Physical Activity: Insufficiently Active (1/8/2024)    Exercise Vital Sign     Days of Exercise per Week: 2 days     Minutes of Exercise per Session: 20 min   Stress: No Stress Concern Present (1/8/2024)    [employer] - Occupational Stress Questionnaire     Feeling of Stress : Not at all   Housing Stability: Low Risk  (1/8/2024)    Housing Stability Vital Sign     Unable to Pay for Housing in the Last Year: No     Number of Places Lived in the Last Year: 1     Unstable Housing in the Last Year: No   [2]   Current Outpatient Medications   Medication Sig Dispense Refill    cetirizine (ZYRTEC) 10 MG tablet Take 10 mg by mouth once daily.      citalopram (CELEXA) 40 MG tablet TAKE 1 TABLET BY MOUTH  EVERY DAY 90 tablet 3    MULTIVITAMIN ORAL Take 1 tablet by mouth once daily.      omeprazole (PRILOSEC) 40 MG capsule Take 1 capsule (40 mg total) by mouth once daily. 90 capsule 3    oxybutynin (DITROPAN-XL) 5 MG TR24 Take 1 tablet (5 mg total) by mouth once daily. 90 tablet 3    meloxicam (MOBIC) 15 MG tablet Take 1 tablet (15 mg total) by mouth once daily. 90 tablet 1     No current facility-administered medications for this visit.      Yes